# Patient Record
Sex: FEMALE | Race: WHITE | NOT HISPANIC OR LATINO | Employment: OTHER | URBAN - METROPOLITAN AREA
[De-identification: names, ages, dates, MRNs, and addresses within clinical notes are randomized per-mention and may not be internally consistent; named-entity substitution may affect disease eponyms.]

---

## 2017-03-06 DIAGNOSIS — Z12.31 ENCOUNTER FOR SCREENING MAMMOGRAM FOR MALIGNANT NEOPLASM OF BREAST: ICD-10-CM

## 2017-03-07 ENCOUNTER — ALLSCRIPTS OFFICE VISIT (OUTPATIENT)
Dept: OTHER | Facility: OTHER | Age: 72
End: 2017-03-07

## 2017-03-07 ENCOUNTER — GENERIC CONVERSION - ENCOUNTER (OUTPATIENT)
Dept: OTHER | Facility: OTHER | Age: 72
End: 2017-03-07

## 2017-03-13 ENCOUNTER — HOSPITAL ENCOUNTER (OUTPATIENT)
Dept: RADIOLOGY | Age: 72
Discharge: HOME | End: 2017-03-13
Payer: COMMERCIAL

## 2017-03-13 DIAGNOSIS — Z12.31 ENCOUNTER FOR SCREENING MAMMOGRAM FOR MALIGNANT NEOPLASM OF BREAST: ICD-10-CM

## 2017-03-13 PROCEDURE — G0202 SCR MAMMO BI INCL CAD: HCPCS

## 2017-06-09 ENCOUNTER — HOSPITAL ENCOUNTER (OUTPATIENT)
Dept: NON INVASIVE DIAGNOSTICS | Facility: CLINIC | Age: 72
Discharge: HOME | End: 2017-06-09
Payer: COMMERCIAL

## 2017-06-09 DIAGNOSIS — I10 ESSENTIAL (PRIMARY) HYPERTENSION: ICD-10-CM

## 2017-06-09 DIAGNOSIS — I35.9 NONRHEUMATIC AORTIC VALVE DISORDER: ICD-10-CM

## 2017-06-09 DIAGNOSIS — R94.31 ABNORMAL ELECTROCARDIOGRAM: ICD-10-CM

## 2017-06-09 PROCEDURE — 93306 TTE W/DOPPLER COMPLETE: CPT

## 2017-09-18 ENCOUNTER — ALLSCRIPTS OFFICE VISIT (OUTPATIENT)
Dept: OTHER | Facility: OTHER | Age: 72
End: 2017-09-18

## 2017-10-26 NOTE — PROGRESS NOTES
Assessment  Assessed    1  Aortic valve disease (424 1) (I35 9)   2  Hypertension (401 9) (I10)   3  Abnormal ECG (794 31) (R94 31)    Plan  Abnormal ECG, Aortic valve disease, Hypertension    · Follow-up visit in 1 year Evaluation and Treatment  Follow-up  Status: Hold For -  Scheduling  Requested for: 26Iyf4434   Ordered; For: Abnormal ECG, Aortic valve disease, Hypertension; Ordered By: Teofilo Masesy Performed:  Due: 31IBR9504   · ECHO COMPLETE WITH CONTRAST IF INDICATED; Status:Need Information - Financial  Authorization; Requested [id number];    Perform:[facility]; NQI:[id number]; Ordered; For:Abnormal ECG, Aortic valve disease, Hypertension; Ordered By:Brayden King; Aortic valve disease    · Verapamil HCl  MG Oral Capsule Extended Release 24 Hour; TAKE 1  CAPSULE DAILY   Rx By: Teofilo Massey; Dispense: 30 Days ; #:30 Capsule Extended Release 24 Hour; Refill: 0;For: Aortic valve disease; THIEN = N; Record; Last Updated By: Shonda Mansfield; 2016 1:12:26 PM   · EKG/ECG- POC; Status:Complete;   Done: 73LCG0982   Perform: In Office; [phone]; Last Updated By:Scheier, Wilford Revel; 2016 1:12:26 PM;Ordered; For:Aortic valve disease; Ordered By:Brayden King; Discussion/Summary  Cardiology Discussion Summary Free Text Note Form [facility]:   I will continue her present medical regimen  She appears well compensated  I've asked her to call me if there is a problem in the interim otherwise I will see her in one years time  She is due for an echo prior to her next visit to assess LV wall thickness and systolic function  Chief Complaint  Chief Complaint Free Text Note Form: Pt  here for yearly cardiology follow up  Pt  has no cardiac complaints  History of Present Illness  Cardiology HPI Free Text Note Form [facility]: Patient is here for follow-up visit  She was last seen by me in September of last year  Since that time she has been well from a cardiac point of view   She has had no chest pain or significant dyspnea  Unfortunately her  broke up with her and she lost about 35 pounds  She has gained some of the weight back  EKG today looks stable compared to prior studies  Hypertension (Follow-Up): The patient presents for follow-up of essential hypertension  The patient states she has been doing well with her blood pressure control since the last visit  She has no significant interval events  Symptoms: The patient is currently asymptomatic  Review of Systems  Cardiology Female ROS:     Cardiac: No complaints of chest pain, no palpitations, no fainting  Skin: No complaints of nonhealing sores or skin rash  Genitourinary: No complaints of recurrent urinary tract infections, frequent urination at night, difficult urination, blood in urine, kidney stones, loss of bladder control, kidney problems, denies any birth control or hormone replacement, is not post menopausal, not currently pregnant  Psychological: No complaints of feeling depressed, anxiety, panic attacks, or difficulty concentrating  General: No complaints of trouble sleeping, lack of energy, fatigue, appetite changes, weight changes, fever, frequent infections, or night sweats  Respiratory: No complaints of shortness of breath, cough with sputum, or wheezing  HEENT: No complaints of serious problems, hearing problems, nose problems, throat problems, or snoring  Gastrointestinal: No complaints of liver problems, nausea, vomiting, heartburn, constipation, bloody stools, diarrhea, problems swallowing, adbominal pain, or rectal bleeding  Hematologic: No complaints of bleeding disorders, anemia, blood clots, or excessive brusing  Neurological: No complaints of numbness, tingling, dizziness, weakness, seizures, headaches, syncope or fainting, AM fatigue, daytime sleepiness, no witnessed apnea episodes  Musculoskeletal: No complaints of arthritis, back pain, or painfull swelling  Active Problems  Problems    1  Aortic valve disease (424 1) (I35 9)   2  Asthma (493 90) (J45 909)   3  Bipolar disorder (296 80) (F31 9)   4  DJD (degenerative joint disease) (715 90) (M19 90)   5  Encounter for gynecological examination without abnormal finding (V72 31) (Z01 419)   6  Encounter for screening mammogram for malignant neoplasm of breast (V76 12)   (Z12 31)   7  Heart murmur (785 2) (R01 1)   8  Hypertension (401 9) (I10)   9  Osteopenia (733 90) (M85 80)   10  Postmenopausal Atrophic Vaginitis (627 3)   11  Urinary incontinence (788 30) (R32)    Past Medical History  Problems    1  History of Asymptomatic menopausal state (V49 81) (Z78 0)   2  History of Bone spur of foot (726 91) (M77 9)   3  History of Cyst of left ovary (620 2) (N83 20)   4  History of bipolar disorder (V11 1) (Z86 59)   5  History of chest pain (V13 89) (Z87 898)   6  History of diverticulitis of colon (V12 79) (Z87 19)   7  History of hypothyroidism (V12 29) (Z86 39)   8  History of rheumatic fever (V12 09) (Z86 79)   9  History of uterine leiomyoma (V13 29) (Z86 018)   10  History of Trigger finger (727 03) (M65 30)   11  History of Umbilical hernia (915 8) (K42 9)  Active Problems And Past Medical History Reviewed: The active problems and past medical history were reviewed and updated today  Surgical History  Problems    1  History of Back Surgery   2  History of Complete Colonoscopy   3  History of Endometrial Biopsy By Suction   4  History of Knee Replacement   5  History of Tonsillectomy   6  History of Umbilical Hernia Herniorrhaphy   7  History of Wrist Excision Of Ganglion    Family History  Mother    1  Family history of Breast Cancer (V16 3)   2  Family history of sepsis (V18 8) (Z83 1)   3  Family history of Thyroid carcinoma  Father    4  Family history of    5  Family history of sepsis (V18 8) (Z83 1)  Sister    6  Family history of Diabetes Mellitus (V18 0)   7  Family history of Heart Disease (V17 49)  Brother    8   Family history of Heart Disease (V17 49)   9  Family history of S/P CABG (coronary artery bypass graft)  Maternal Aunt    10  Family history of Breast Cancer (V16 3)   11  Family history of Breast Cancer (V16 3)  Family History    12  Family history of Breast Cancer (V16 3)    Social History  Problems    · Never A Smoker   · Occasional alcohol use   · Denied: History of Smokes cigarettes    Current Meds   1  Aspirin 81 MG TABS; Take 1 tablet daily; Therapy: 63UYO4883 to (Evaluate:17Nov2015) Recorded   2  Calcium + D TABS; TAKE 1 TABLET DAILY; Therapy: (Recorded:03Sep2015) to Recorded   3  Diovan -12 5 MG Oral Tablet; TAKE 1 TABLET ONCE DAILY; Therapy: (Recorded:03Sep2015) to Recorded   4  Doxazosin Mesylate 4 MG Oral Tablet; TAKE 1 TABLET DAILY; Therapy: 73OAZ8571 to (Evaluate:15Feb2016)  Requested for: 69EYY4905 Recorded   5  Flovent  MCG/ACT Inhalation Aerosol; Therapy: 84GVF0805 to (Last Rx:29Mar2011)  Requested for: 93GIK9322 Ordered   6  Levothyroxine Sodium 137 MCG Oral Tablet; Therapy: 87NOB1147 to (Last Rx:01Pwd9054)  Requested for: 12Svi7817 Ordered   7  Multi-Vitamin Oral Tablet; TAKE 1 TABLET DAILY; Therapy: 83PCM3137 to Recorded   8  Omega-3 CAPS; TAKE CAPSULE Twice daily; Therapy: (Recorded:03Sep2015) to Recorded   9  Synthroid 150 MCG Oral Tablet; Therapy: 32YHD2903 to (Last Rx:81Azh7610)  Requested for: 32QAT6870 Ordered   10  TEGretol- MG Oral Tablet Extended Release 12 Hour; TAKE 1 TABLET Q  I D; Therapy: 93REM9100 to Recorded   11  Vitamin D 1000 UNIT CAPS; TAKE AS DIRECTED; Therapy: 42GXA4826 to Recorded   12  Wellbutrin 100 MG Oral Tablet; TAKE 1 TABLET EVERY 12 HOURS DAILY; Therapy: 10RFW4950 to Recorded  Medication List Reviewed: The medication list was reviewed and updated today  Allergies  Medication    1  Sulfa Drugs   2   Beta Adrenergic Blockers    Vitals  Vital Signs    Recorded: 12TGN3438 37:37ZF   Systolic 528, RUE, Sitting   Diastolic 70, RUE, Sitting   Heart Rate 66, R Radial   Pulse Quality Regular, R Radial   Height 5 ft 6 in   Weight 185 lb 4 oz   BMI Calculated 29 9   BSA Calculated 1 94     Physical Exam    Constitutional   General appearance: No acute distress, well appearing and well nourished  Eyes   Conjunctiva and Sclera examination: Conjunctiva pink, sclera anicteric  Neck   Neck and thyroid: Normal, supple, trachea midline, no thyromegaly  Pulmonary   Respiratory effort: No increased work of breathing or signs of respiratory distress  Auscultation of lungs: Clear to auscultation, no rales, no rhonchi, no wheezing, good air movement  Cardiovascular   Palpation of heart: Normal PMI, no thrills  Auscultation of heart: Normal rate and rhythm, normal S1 and S2, no murmurs  Carotid pulses: Normal, 2+ bilaterally  Examination of extremities for edema and/or varicosities: Normal     Chest - Chest: Normal    Musculoskeletal Gait and station: Normal gait  -- Digits and nails: Normal without clubbing or cyanosis     Neurologic - Speech: Normal     Psychiatric - Orientation to person, place, and time: Normal -- Mood and affect: Normal       Results/Data  ECG Report: Sinus rhythm with minor nonspecific ST segment changes      Future Appointments    Date/Time Provider Specialty Site   03/07/2017 01:00 PM Christine Riley MD Obstetrics/Gynecology [facility] OB     Signatures   Electronically signed by : NEENA Lopez ; Sep  9 2016  1:25PM EST                       (Author)

## 2018-01-10 NOTE — RESULT NOTES
Verified Results  * MAMMO SCREENING BILATERAL W CAD [id number] 01:51PM [address] Order Number: [id number]    - Patient Instructions: To schedule this appointment, please contact Central Scheduling at [phone]  Do not wear any perfume, powder, lotion or deodorant on breast or underarm area  Please bring your doctors order, referral (if needed) and insurance information with you on the day of the test  Failure to bring this information may result in this test being rescheduled  Arrive 15 minutes prior to your appointment time to register  On the day of your test, please bring any prior mammogram or breast studies with you that were not performed at a [facility]  Failure to bring prior exams may result in your test needing to be rescheduled  Test Name Result Flag Reference   MAMMO SCREENING BILATERAL W CAD (Report)     Patient History:   Patient is postmenopausal    Family history of breast cancer at age 47 in mother, breast    cancer at age 48 or over in maternal aunt, breast cancer at age    48 or over in maternal aunt, breast cancer at age 48 or over in    maternal aunt, breast cancer at age 48 or over in maternal aunt  Took hormonal contraceptives for 10 years  Patient has never smoked  Patient's BMI is 29 0  Reason for exam: screening, asymptomatic  Mammo Screening Bilateral W CAD: March 13, 2017 - Check In #:    [de-identified]   Bilateral CC and MLO view(s) were taken  Technologist: JUAN FRANCISCO Law (JUAN FRANCISCO)(M)   Prior study comparison: January 19, 2016, mammo screening    bilateral W CAD performed at [email or web address]  August 1, 2013, digital bilateral screening mammogram performed at [facility]  July 31, 2012, digital bilateral    screening mammogram performed at [facility]  July 18, 2011, digital bilateral screening mammogram performed at   [facility]   July 12, 2010, digital bilateral screening mammogram performed at [facility]  There are scattered fibroglandular densities  No dominant soft tissue mass, architectural distortion or    suspicious calcifications are noted in either breast  The skin    and nipple contours are within normal limits  No evidence of malignancy  No significant changes when compared with prior studies  ACR BI-RADSï¾® Assessments: BiRad:1 - Negative     Recommendation:   Routine screening mammogram of both breasts in 1 year  A    reminder letter will be scheduled  Analyzed by CAD     8-10% of cancers will be missed on mammography  Management of a    palpable abnormality must be based on clinical grounds  Patients   will be notified of their results via letter from our facility  Accredited by [employer] of Radiology and FDA  Transcription Location: [facility] 98: [id number]     Risk Value(s):   Tyrer-Cuzick 10 Year: 9 500%, Tyrer-Cuzick Lifetime: 13 600%,    Myriad Table: 1 5%, JUNE 5 Year: 3 3%, NCI Lifetime: 9 1%, MRS    : Based on personal and/or family history,    consideration of hereditary risk assessment may be warranted     Signed by:   Zak Weems MD   3/13/17

## 2018-01-14 VITALS
WEIGHT: 215.5 LBS | HEART RATE: 81 BPM | SYSTOLIC BLOOD PRESSURE: 146 MMHG | BODY MASS INDEX: 35.9 KG/M2 | HEIGHT: 65 IN | DIASTOLIC BLOOD PRESSURE: 76 MMHG

## 2018-01-15 VITALS
HEIGHT: 65 IN | BODY MASS INDEX: 33.99 KG/M2 | DIASTOLIC BLOOD PRESSURE: 78 MMHG | WEIGHT: 204 LBS | SYSTOLIC BLOOD PRESSURE: 134 MMHG

## 2018-01-18 NOTE — RESULT NOTES
Verified Results  * MAMMO SCREENING BILATERAL W CAD [id number] 03:42PM [facility]     Test Name Result Flag Reference   MAMMO SCREENING BILATERAL W CAD (Report)     Patient History:   Patient is postmenopausal    Family history of breast cancer in mother at age 47 and breast    cancer in 4 maternal aunts at age 48 or over  Took hormonal contraceptives for 10 years  Patient has never smoked  Patient's BMI is 29 0  Reason for exam: screening (asymptomatic)  Mammo Screening Bilateral W CAD: January 19, 2016 - Check In #:    [de-identified]   Bilateral CC and MLO view(s) were taken  Technologist: Emmanuel Palmer RT(R)(M)   Prior study comparison: August 1, 2013, digital bilateral    screening mammogram performed at [address]  July 31, 2012, digital bilateral screening mammogram performed at   [address]  July 18, 2011, digital bilateral    screening mammogram performed at [address]  July 12, 2010, digital bilateral screening mammogram performed at   [address]  July 10, 2009, bilateral [facility]    digtl charis scrn mammo performed at [address]  The breast tissue is almost entirely fat  No dominant soft tissue mass or suspicious calcifications are    noted  The skin and nipple contours are within normal limits  No mammographic evidence of malignancy  No significant changes when compared with prior studies  ASSESSMENT: BiRad:1 - Negative     Recommendation:   Routine screening mammogram of both breasts in 1 year  A    reminder letter will be scheduled  Analyzed by CAD     8-10% of cancers will be missed on mammography  Management of a    palpable abnormality must be based on clinical grounds  Patients   will be notified of their results via letter from our facility  Accredited by [employer] of Rad[employer] and FDA       Transcription Location: [facility] 98: [id number]     Risk Value(s):   Payam-Popeye 10 Year: 9 255%, Payam-Popeye Lifetime: 14 406%,    Myriad Table: 1 5%, JUNE 5 Year: 3 3%, NCI Lifetime: 9 5%, MRS    : Based on personal and/or family history,    consideration of hereditary risk assessment may be warranted     Signed by:   Bridgette Chandler MD   1/19/16

## 2018-03-16 ENCOUNTER — TRANSCRIBE ORDERS (OUTPATIENT)
Dept: RADIOLOGY | Facility: CLINIC | Age: 73
End: 2018-03-16

## 2018-03-19 ENCOUNTER — TELEPHONE (OUTPATIENT)
Dept: OBGYN CLINIC | Facility: CLINIC | Age: 73
End: 2018-03-19

## 2018-03-19 ENCOUNTER — HOSPITAL ENCOUNTER (OUTPATIENT)
Dept: RADIOLOGY | Age: 73
Discharge: HOME | End: 2018-03-19
Payer: COMMERCIAL

## 2018-03-19 DIAGNOSIS — Z12.31 SCREENING MAMMOGRAM, ENCOUNTER FOR: Primary | ICD-10-CM

## 2018-03-19 DIAGNOSIS — Z12.31 ENCOUNTER FOR SCREENING MAMMOGRAM FOR MALIGNANT NEOPLASM OF BREAST: ICD-10-CM

## 2018-03-19 DIAGNOSIS — Z12.31 SCREENING MAMMOGRAM, ENCOUNTER FOR: ICD-10-CM

## 2018-03-19 PROCEDURE — 77067 SCR MAMMO BI INCL CAD: CPT

## 2018-06-01 ENCOUNTER — ANNUAL EXAM (OUTPATIENT)
Dept: OBGYN CLINIC | Facility: CLINIC | Age: 73
End: 2018-06-01
Payer: COMMERCIAL

## 2018-06-01 VITALS
HEIGHT: 65 IN | SYSTOLIC BLOOD PRESSURE: 128 MMHG | DIASTOLIC BLOOD PRESSURE: 66 MMHG | WEIGHT: 165 LBS | BODY MASS INDEX: 27.49 KG/M2

## 2018-06-01 DIAGNOSIS — Z01.419 ENCOUNTER FOR GYNECOLOGICAL EXAMINATION (GENERAL) (ROUTINE) WITHOUT ABNORMAL FINDINGS: Primary | ICD-10-CM

## 2018-06-01 DIAGNOSIS — Z12.31 ENCOUNTER FOR SCREENING MAMMOGRAM FOR MALIGNANT NEOPLASM OF BREAST: ICD-10-CM

## 2018-06-01 PROCEDURE — S0612 ANNUAL GYNECOLOGICAL EXAMINA: HCPCS | Performed by: OBSTETRICS & GYNECOLOGY

## 2018-06-01 PROCEDURE — G0145 SCR C/V CYTO,THINLAYER,RESCR: HCPCS | Performed by: OBSTETRICS & GYNECOLOGY

## 2018-06-01 NOTE — PROGRESS NOTES
Assessment/Plan: This 77-year-old patient is seen today for annual gyn evaluation  She has a complaint of significant weight loss over the last several months  No problem-specific Assessment & Plan notes found for this encounter  Subjective:      Patient ID: Tennille Gottlieb is a 67 y o  female  This 77-year-old patient has had no vaginal bleeding or episodes of vaginitis over the past year  She has no chronic headaches fainting spells or hot flashes  She sleeps about 9 hr at night  Her bowel function is normal  She does wear pads daily for urine stress incontinence  She has had no urinary tract infections over the past year  There is still was no divorce from her  with her marriage  He apparently still lives in [facility]  Review of Systems   Constitutional: Negative  HENT: Negative  Eyes: Negative  Respiratory: Negative  Cardiovascular: Negative  Gastrointestinal: Negative  Endocrine: Negative  Genitourinary: Negative  She does wear pads for urine stress incontinence  Musculoskeletal: Negative  Skin: Negative  Allergic/Immunologic: Negative  Neurological: Negative  Hematological: Negative  Psychiatric/Behavioral: Negative  Objective:      /66 (BP Location: Left arm, Patient Position: Sitting, Cuff Size: Standard)   Ht 5' 4 5" (1 638 m)   Wt 74 8 kg (165 lb)   BMI 27 88 kg/m²          Physical Exam   Constitutional: She is oriented to person, place, and time  She appears well-developed and well-nourished  HENT:   Head: Normocephalic  Neck: Normal range of motion  Neck supple  Cardiovascular: Normal rate, regular rhythm, normal heart sounds and intact distal pulses  Pulmonary/Chest: Effort normal and breath sounds normal    Abdominal: Soft  Bowel sounds are normal    Genitourinary: Uterus normal    Musculoskeletal: Normal range of motion  Neurological: She is alert and oriented to person, place, and time  Skin: Skin is warm and dry  Psychiatric: She has a normal mood and affect  Nursing note and vitals reviewed  breast exam is normal  Pelvic exam reveals uterus to be anterior mobile normal size  No ovarian masses are detected  Vagina shows no blood or discharge  The vulvar structures are normal  Rectal exam shows no bladder masses in the rectum and no nodularity in the cul-de-sac

## 2018-06-01 NOTE — PATIENT INSTRUCTIONS
This 72-year-old patient was told that her breast and pelvic exam are normal  She was advised to continue to see her ophthalmologist with regard to her conjunctivitis in her left eye  She will call if she sees any vaginal bleeding over the next year

## 2018-06-06 LAB
LAB AP GYN PRIMARY INTERPRETATION: NORMAL
Lab: NORMAL

## 2018-06-11 ENCOUNTER — APPOINTMENT (OUTPATIENT)
Dept: LAB | Facility: HOSPITAL | Age: 73
End: 2018-06-11
Attending: INTERNAL MEDICINE
Payer: COMMERCIAL

## 2018-06-11 ENCOUNTER — TRANSCRIBE ORDERS (OUTPATIENT)
Dept: LAB | Facility: HOSPITAL | Age: 73
End: 2018-06-11

## 2018-06-11 ENCOUNTER — TRANSCRIBE ORDERS (OUTPATIENT)
Dept: ADMINISTRATIVE | Facility: HOSPITAL | Age: 73
End: 2018-06-11

## 2018-06-11 DIAGNOSIS — K83.8 DILATED INTRAHEPATIC BILE DUCT: Primary | ICD-10-CM

## 2018-06-11 DIAGNOSIS — R74.9 NONSPECIFIC ABNORMAL SERUM ENZYME LEVELS: ICD-10-CM

## 2018-06-11 DIAGNOSIS — K83.8 BILE DUCT PROLIFERATION: ICD-10-CM

## 2018-06-11 DIAGNOSIS — K83.8 BILE DUCT PROLIFERATION: Primary | ICD-10-CM

## 2018-06-11 LAB — FERRITIN SERPL-MCNC: 83 NG/ML (ref 8–388)

## 2018-06-11 PROCEDURE — 86376 MICROSOMAL ANTIBODY EACH: CPT

## 2018-06-11 PROCEDURE — 86038 ANTINUCLEAR ANTIBODIES: CPT

## 2018-06-11 PROCEDURE — 36415 COLL VENOUS BLD VENIPUNCTURE: CPT

## 2018-06-11 PROCEDURE — 86235 NUCLEAR ANTIGEN ANTIBODY: CPT

## 2018-06-11 PROCEDURE — 86301 IMMUNOASSAY TUMOR CA 19-9: CPT

## 2018-06-11 PROCEDURE — 80074 ACUTE HEPATITIS PANEL: CPT

## 2018-06-11 PROCEDURE — 83516 IMMUNOASSAY NONANTIBODY: CPT

## 2018-06-11 PROCEDURE — 82728 ASSAY OF FERRITIN: CPT

## 2018-06-12 LAB
ACTIN IGG SERPL-ACNC: 7 UNITS (ref 0–19)
CANCER AG19-9 SERPL-ACNC: 21 U/ML (ref 0–35)
HAV IGM SER QL: NORMAL
HBV CORE IGM SER QL: NORMAL
HBV SURFACE AG SER QL: NORMAL
HCV AB SER QL: NORMAL
THYROPEROXIDASE AB SERPL-ACNC: 270 IU/ML (ref 0–34)
TTG IGA SER-ACNC: <2 U/ML (ref 0–3)
TTG IGG SER-ACNC: <2 U/ML (ref 0–5)

## 2018-06-13 LAB — RYE IGE QN: NEGATIVE

## 2018-06-26 ENCOUNTER — HOSPITAL ENCOUNTER (OUTPATIENT)
Dept: RADIOLOGY | Facility: HOSPITAL | Age: 73
Discharge: HOME | End: 2018-06-26
Attending: INTERNAL MEDICINE
Payer: COMMERCIAL

## 2018-06-26 DIAGNOSIS — K83.8 DILATED INTRAHEPATIC BILE DUCT: ICD-10-CM

## 2018-06-26 PROCEDURE — 74183 MRI ABD W/O CNTR FLWD CNTR: CPT

## 2018-06-26 PROCEDURE — A9585 GADOBUTROL INJECTION: HCPCS | Performed by: INTERNAL MEDICINE

## 2018-06-26 RX ADMIN — GADOBUTROL 7 ML: 604.72 INJECTION INTRAVENOUS at 15:13

## 2018-07-02 ENCOUNTER — ANESTHESIA EVENT (OUTPATIENT)
Dept: GASTROENTEROLOGY | Facility: HOSPITAL | Age: 73
End: 2018-07-02
Payer: COMMERCIAL

## 2018-07-02 ENCOUNTER — ANESTHESIA (OUTPATIENT)
Dept: GASTROENTEROLOGY | Facility: HOSPITAL | Age: 73
End: 2018-07-02
Payer: COMMERCIAL

## 2018-07-02 ENCOUNTER — HOSPITAL ENCOUNTER (OUTPATIENT)
Facility: HOSPITAL | Age: 73
Setting detail: OUTPATIENT SURGERY
Discharge: HOME | End: 2018-07-02
Attending: INTERNAL MEDICINE | Admitting: INTERNAL MEDICINE
Payer: COMMERCIAL

## 2018-07-02 ENCOUNTER — APPOINTMENT (OUTPATIENT)
Dept: RADIOLOGY | Facility: HOSPITAL | Age: 73
End: 2018-07-02
Attending: INTERNAL MEDICINE
Payer: COMMERCIAL

## 2018-07-02 VITALS
BODY MASS INDEX: 26.36 KG/M2 | OXYGEN SATURATION: 94 % | HEIGHT: 66 IN | HEART RATE: 71 BPM | DIASTOLIC BLOOD PRESSURE: 62 MMHG | TEMPERATURE: 97 F | SYSTOLIC BLOOD PRESSURE: 112 MMHG | WEIGHT: 164 LBS | RESPIRATION RATE: 16 BRPM

## 2018-07-02 PROCEDURE — 76000 FLUOROSCOPY <1 HR PHYS/QHP: CPT

## 2018-07-02 PROCEDURE — C1769 GUIDE WIRE: HCPCS | Performed by: INTERNAL MEDICINE

## 2018-07-02 RX ORDER — FLUTICASONE PROPIONATE 220 UG/1
2 AEROSOL, METERED RESPIRATORY (INHALATION) 2 TIMES DAILY PRN
COMMUNITY

## 2018-07-02 RX ORDER — EPHEDRINE SULFATE 50 MG/ML
INJECTION, SOLUTION INTRAVENOUS AS NEEDED
Status: DISCONTINUED | OUTPATIENT
Start: 2018-07-02 | End: 2018-07-02 | Stop reason: SURG

## 2018-07-02 RX ORDER — SODIUM CHLORIDE 9 MG/ML
50 INJECTION, SOLUTION INTRAVENOUS CONTINUOUS
Status: DISCONTINUED | OUTPATIENT
Start: 2018-07-02 | End: 2018-07-02 | Stop reason: HOSPADM

## 2018-07-02 RX ORDER — PROPOFOL 10 MG/ML
INJECTION, EMULSION INTRAVENOUS AS NEEDED
Status: DISCONTINUED | OUTPATIENT
Start: 2018-07-02 | End: 2018-07-02 | Stop reason: SURG

## 2018-07-02 RX ORDER — ONDANSETRON 2 MG/ML
INJECTION INTRAMUSCULAR; INTRAVENOUS AS NEEDED
Status: DISCONTINUED | OUTPATIENT
Start: 2018-07-02 | End: 2018-07-02 | Stop reason: SURG

## 2018-07-02 RX ORDER — ROCURONIUM BROMIDE 10 MG/ML
INJECTION, SOLUTION INTRAVENOUS AS NEEDED
Status: DISCONTINUED | OUTPATIENT
Start: 2018-07-02 | End: 2018-07-02 | Stop reason: SURG

## 2018-07-02 RX ORDER — SUCCINYLCHOLINE CHLORIDE 20 MG/ML
INJECTION INTRAMUSCULAR; INTRAVENOUS AS NEEDED
Status: DISCONTINUED | OUTPATIENT
Start: 2018-07-02 | End: 2018-07-02 | Stop reason: SURG

## 2018-07-02 RX ORDER — GLYCOPYRROLATE 0.2 MG/ML
INJECTION INTRAMUSCULAR; INTRAVENOUS AS NEEDED
Status: DISCONTINUED | OUTPATIENT
Start: 2018-07-02 | End: 2018-07-02 | Stop reason: SURG

## 2018-07-02 RX ADMIN — ONDANSETRON 4 MG: 2 INJECTION INTRAMUSCULAR; INTRAVENOUS at 12:59

## 2018-07-02 RX ADMIN — NEOSTIGMINE METHYLSULFATE 4 MG: 1 INJECTION, SOLUTION INTRAMUSCULAR; INTRAVENOUS; SUBCUTANEOUS at 12:53

## 2018-07-02 RX ADMIN — EPHEDRINE SULFATE 5 MG: 50 INJECTION, SOLUTION INTRAMUSCULAR; INTRAVENOUS; SUBCUTANEOUS at 12:02

## 2018-07-02 RX ADMIN — EPHEDRINE SULFATE 5 MG: 50 INJECTION, SOLUTION INTRAMUSCULAR; INTRAVENOUS; SUBCUTANEOUS at 12:06

## 2018-07-02 RX ADMIN — PROPOFOL 160 MG: 10 INJECTION, EMULSION INTRAVENOUS at 11:29

## 2018-07-02 RX ADMIN — SUCCINYLCHOLINE CHLORIDE 100 MG: 20 INJECTION, SOLUTION INTRAMUSCULAR; INTRAVENOUS at 11:29

## 2018-07-02 RX ADMIN — EPHEDRINE SULFATE 5 MG: 50 INJECTION, SOLUTION INTRAMUSCULAR; INTRAVENOUS; SUBCUTANEOUS at 11:50

## 2018-07-02 RX ADMIN — EPHEDRINE SULFATE 5 MG: 50 INJECTION, SOLUTION INTRAMUSCULAR; INTRAVENOUS; SUBCUTANEOUS at 12:31

## 2018-07-02 RX ADMIN — SODIUM CHLORIDE: 0.9 INJECTION, SOLUTION INTRAVENOUS at 11:27

## 2018-07-02 RX ADMIN — GLYCOPYRROLATE 0.8 MG: 0.2 INJECTION, SOLUTION INTRAMUSCULAR; INTRAVENOUS at 12:53

## 2018-07-02 RX ADMIN — EPHEDRINE SULFATE 10 MG: 50 INJECTION, SOLUTION INTRAMUSCULAR; INTRAVENOUS; SUBCUTANEOUS at 12:34

## 2018-07-02 RX ADMIN — GLUCAGON HYDROCHLORIDE 0.5 MG: KIT at 12:24

## 2018-07-02 RX ADMIN — ROCURONIUM BROMIDE 20 MG: 10 INJECTION INTRAVENOUS at 11:56

## 2018-07-02 NOTE — ANESTHESIA POSTPROCEDURE EVALUATION
Post-Op Assessment Note      CV Status:  Stable    Mental Status:  Alert and awake    Hydration Status:  Euvolemic    PONV Controlled:  Controlled    Airway Patency:  Patent    Post Op Vitals Reviewed: Yes          Staff: AnesthesiologistGEN           BP   96/44   Temp  98   Pulse  75   Resp   16   SpO2   96

## 2018-07-02 NOTE — OP NOTE
**** GI/ENDOSCOPY REPORT ****     INTRODUCTION: Endoscopic Retrograde Cholangiopancreatography - A 67  year-old female patient presents for an outpatient Endoscopic Retrograde   Cholangiopancreatography at [address] [place] [address]  INDICATIONS: Suspected bile duct stone, diagnosed by MRCP  CONSENT:  The benefits, risks, and alternatives to the procedure were   discussed and informed consent was obtained from the patient  PREPARATION:  EKG, pulse, pulse oximetry and blood pressure were monitored   throughout the procedure  MEDICATIONS: Anesthesia-check records     PROCEDURE:  The endoscope was passed with ease through the mouth under   direct visualization and advanced to the duodenum  The scope was withdrawn   and the mucosa was carefully examined  FINDINGS:   Biliary tract: An attempted examination of the biliary tract   was unsuccessful  The major papilla was located on the edge of the   diverticulum  The major papilla was " very floppy"  Unable to cannulate   despite multiple attempts with different catheters including needle knife  Major papilla: The major papilla was found inside a medium-sized   diverticulum  The major papilla appeared to be normal      COMPLICATIONS: There were no complications  IMPRESSIONS:  Failed biliary tract examination  The major papilla was   located on the edge of the diverticulum  Major papilla found within a   diverticulum  Major papilla normal      RECOMMENDATIONS: Call Dr Venegas Files [phone] with questions or   problems       ESTIMATED BLOOD LOSS:     PROCEDURE CODES:     ICD-9 Codes: 574 5 Calculus of bile duct w/o mention of cholecystitis     ICD-10 Codes: R93 3 Abnormal findings on diagnostic imaging of other parts   of digestive tract     PERFORMED BY: NEENA Nuno  on 07/02/2018  Version 2, modified and electronically signed by NEENA Nuno  on 07/02/2018 at 16:36, superseding version 1 signed on 07/02/2018 at   12:57

## 2018-07-02 NOTE — OP NOTE
**** GI/ENDOSCOPY REPORT ****     PATIENT NAME: Kacie Flowers - VISIT ID:  Patient ID: [id number]   YOB: 1945     INTRODUCTION: Endoscopic Retrograde Cholangiopancreatography - A 67  year-old female patient presents for an outpatient Endoscopic Retrograde   Cholangiopancreatography at [address] [place] [address]  INDICATIONS: Suspected bile duct stone, diagnosed by MRCP  CONSENT:  The benefits, risks, and alternatives to the procedure were   discussed and informed consent was obtained from the patient  PREPARATION:  EKG, pulse, pulse oximetry and blood pressure were monitored   throughout the procedure  MEDICATIONS: Anesthesia-check records     PROCEDURE:  The endoscope was passed with ease through the mouth under   direct visualization and advanced to the duodenum  The scope was withdrawn   and the mucosa was carefully examined  FINDINGS:   Biliary tract: An attempted examination of the biliary tract   was unsuccessful  The major papilla was located on the edge of the   diverticulum  The major papilla was " very floppy"  Unable to cannulate   despite multiple attempts with different catheters including needle knife  Major papilla: The major papilla was found inside a medium-sized   diverticulum  The major papilla appeared to be normal      COMPLICATIONS: There were no complications  IMPRESSIONS:  Failed biliary tract examination  The major papilla was   located on the edge of the diverticulum  Major papilla found within a   diverticulum  Major papilla normal      RECOMMENDATIONS: Call Dr Itzel Gutierrez [phone] with questions or   problems       ESTIMATED BLOOD LOSS:     PROCEDURE CODES:     ICD-9 Codes: 574 5 Calculus of bile duct w/o mention of cholecystitis     ICD-10 Codes: R93 3 Abnormal findings on diagnostic imaging of other parts   of digestive tract     PERFORMED BY: NEENA Trejo  on 07/02/2018  Version 1, electronically signed by NEENA Mejia  on   07/02/2018 at 12:57

## 2018-07-02 NOTE — H&P
History and Physical - [facility]  Taty Walker 67 y o  female MRN: [id number]                  HPI: Taty Walker is a 67y o  year old female who presents for choledocholithiasis, elevated liver function tests and dilated common bile duct on MRCP  REVIEW OF SYSTEMS: Per the HPI, and otherwise unremarkable  Historical Information   Past Medical History:   Diagnosis Date    Arthritis     Asthma     Bipolar disorder ([address] )     Cyst of left ovary     Depression     Diverticulitis of colon     Hypertension     Hypothyroidism     Rheumatic fever     history of    Umbilical hernia     Uterine leiomyoma      Past Surgical History:   Procedure Laterality Date    ACHILLES TENDON SURGERY      BACK SURGERY      BILATERAL KNEE ARTHROSCOPY      COLONOSCOPY N/A 9/7/2016    Procedure: COLONOSCOPY;  Surgeon: Milo Dougherty MD;  Location: [facility];   Service:     ENDOMETRIAL BIOPSY      BY SUCTION    GANGLION CYST EXCISION      WRIST    HERNIA REPAIR      UMBILICAL HERNIA HERNIORRHAPHY    REPLACEMENT TOTAL KNEE      SPINE SURGERY      TONSILLECTOMY AND ADENOIDECTOMY       Social History   History   Alcohol Use    Yes     Comment: rarely     History   Drug Use No     History   Smoking Status    Never Smoker   Smokeless Tobacco    Never Used     Family History   Problem Relation Age of Onset   Reesa Reichmann Breast cancer Mother 47    Other Mother         SEPSIS    Thyroid cancer Mother     Other Father         SEPSIS     Diabetes Sister     Heart disease Sister     Heart disease Brother         CABG     Breast cancer Maternal Aunt     Breast cancer Family         4 AUNTS        Meds/Allergies     Prescriptions Prior to Admission   Medication    ALBUTEROL SULFATE HFA IN    aspirin (ECOTRIN LOW STRENGTH) 81 mg EC tablet    fluticasone (FLOVENT HFA) 220 mcg/act inhaler    Levothyroxine Sodium 137 MCG CAPS    valsartan-hydrochlorothiazide (DIOVAN-HCT) 320-12 5 MG per tablet    verapamil (CALAN-SR) 180 mg CR tablet    buPROPion (WELLBUTRIN SR) 100 mg 12 hr tablet    Calcium Carbonate-Vitamin D (CALTRATE 600+D PO)    carBAMazepine (TEGretol) 200 mg tablet    Cholecalciferol (VITAMIN D3) 1000 UNITS CAPS    doxazosin (CARDURA) 4 mg tablet    levothyroxine 150 mcg tablet    Omega-3 Fatty Acids (OMEGA-3 FISH OIL PO)       Allergies   Allergen Reactions    Beta Adrenergic Blockers     Sulfa Antibiotics Hives       Objective     Blood pressure 135/72, pulse 69, temperature (!) 97 °F (36 1 °C), temperature source Oral, resp  rate 16, height 5' 6" (1 676 m), weight 74 4 kg (164 lb), SpO2 97 %        PHYSICAL EXAM    Gen: NAD  CV: RRR  CHEST: Clear  ABD: soft, NT/ND  EXT: no edema      ASSESSMENT/PLAN:  This is a 67y o  year old female here for choledocholithiasis and abnormal liver function tests, dilated common bile duct on MRCP    PLAN:  ERCP with stone extraction  Procedure:

## 2018-07-02 NOTE — ANESTHESIA PREPROCEDURE EVALUATION
Review of Systems/Medical History    Chart reviewed  No history of anesthetic complications     Cardiovascular  Exercise tolerance (METS): >4,  Hypertension on > 1 medication,   Comment: 2017 SUMMARY     LEFT VENTRICLE:  Systolic function was normal  Ejection fraction was estimated to be 60 %  There were no regional wall motion abnormalities  There was no evidence of concentric hypertrophy      RIGHT VENTRICLE:  The size was normal   Systolic function was normal      MITRAL VALVE:  There was mild annular calcification  There was trace regurgitation      TRICUSPID VALVE:  There was trace regurgitation  Pulmonary artery systolic pressure was within the normal range ,  Pulmonary  Asthma , well controlled/ stable Asthma type of rescue: PRN inhaler,        GI/Hepatic      Comment: Gallstones, obstruction          Endo/Other  History of thyroid disease , hypothyroidism,      GYN       Hematology   Musculoskeletal    Arthritis     Neurology   Psychology   Depression , bipolar disorder,              Physical Exam    Airway    Mallampati score: I  TM Distance: >3 FB  Neck ROM: full     Dental   No notable dental hx     Cardiovascular      Pulmonary      Other Findings        Anesthesia Plan  ASA Score- 2     Anesthesia Type- general with ASA Monitors  Additional Monitors:   Airway Plan: ETT  Plan Factors-    Induction- intravenous  Postoperative Plan-     Informed Consent- Anesthetic plan and risks discussed with patient  I personally reviewed this patient with the CRNA  Discussed and agreed on the Anesthesia Plan with the CRNA  Gilda Pagan

## 2018-09-27 ENCOUNTER — APPOINTMENT (EMERGENCY)
Dept: RADIOLOGY | Facility: HOSPITAL | Age: 73
DRG: 184 | End: 2018-09-27
Payer: COMMERCIAL

## 2018-09-27 ENCOUNTER — HOSPITAL ENCOUNTER (INPATIENT)
Facility: HOSPITAL | Age: 73
LOS: 1 days | Discharge: NURSING, REHAB OR LONG-TERM CARE | DRG: 184 | End: 2018-10-02
Attending: EMERGENCY MEDICINE | Admitting: SURGERY
Payer: COMMERCIAL

## 2018-09-27 DIAGNOSIS — S22.41XD CLOSED FRACTURE OF MULTIPLE RIBS OF RIGHT SIDE WITH ROUTINE HEALING: ICD-10-CM

## 2018-09-27 DIAGNOSIS — S22.49XA RIB FRACTURES: ICD-10-CM
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
## 2018-09-27 DIAGNOSIS — S01.511A LIP LACERATION, INITIAL ENCOUNTER: ICD-10-CM

## 2018-09-27 DIAGNOSIS — S52.125A CLOSED NONDISPLACED FRACTURE OF HEAD OF LEFT RADIUS, INITIAL ENCOUNTER: ICD-10-CM

## 2018-09-27 LAB
ANION GAP SERPL CALCULATED.3IONS-SCNC: 7 MMOL/L (ref 4–13)
ATRIAL RATE: 68 BPM
BASOPHILS # BLD AUTO: 0.03 THOUSANDS/ΜL (ref 0–0.1)
BASOPHILS NFR BLD AUTO: 0 % (ref 0–1)
BUN SERPL-MCNC: 9 MG/DL (ref 5–25)
CALCIUM SERPL-MCNC: 8.5 MG/DL (ref 8.3–10.1)
CHLORIDE SERPL-SCNC: 94 MMOL/L (ref 100–108)
CO2 SERPL-SCNC: 27 MMOL/L (ref 21–32)
CREAT SERPL-MCNC: 0.73 MG/DL (ref 0.6–1.3)
EOSINOPHIL # BLD AUTO: 0.14 THOUSAND/ΜL (ref 0–0.61)
EOSINOPHIL NFR BLD AUTO: 2 % (ref 0–6)
ERYTHROCYTE [DISTWIDTH] IN BLOOD BY AUTOMATED COUNT: 12.2 % (ref 11.6–15.1)
GFR SERPL CREATININE-BSD FRML MDRD: 83 ML/MIN/1.73SQ M
GLUCOSE SERPL-MCNC: 109 MG/DL (ref 65–140)
HCT VFR BLD AUTO: 38.3 % (ref 34.8–46.1)
HGB BLD-MCNC: 12.7 G/DL (ref 11.5–15.4)
IMM GRANULOCYTES # BLD AUTO: 0.06 THOUSAND/UL (ref 0–0.2)
IMM GRANULOCYTES NFR BLD AUTO: 1 % (ref 0–2)
LYMPHOCYTES # BLD AUTO: 1.29 THOUSANDS/ΜL (ref 0.6–4.47)
LYMPHOCYTES NFR BLD AUTO: 19 % (ref 14–44)
MCH RBC QN AUTO: 30.9 PG (ref 26.8–34.3)
MCHC RBC AUTO-ENTMCNC: 33.2 G/DL (ref 31.4–37.4)
MCV RBC AUTO: 93 FL (ref 82–98)
MONOCYTES # BLD AUTO: 0.51 THOUSAND/ΜL (ref 0.17–1.22)
MONOCYTES NFR BLD AUTO: 7 % (ref 4–12)
NEUTROPHILS # BLD AUTO: 4.9 THOUSANDS/ΜL (ref 1.85–7.62)
NEUTS SEG NFR BLD AUTO: 71 % (ref 43–75)
NRBC BLD AUTO-RTO: 0 /100 WBCS
P AXIS: 63 DEGREES
PLATELET # BLD AUTO: 270 THOUSANDS/UL (ref 149–390)
PMV BLD AUTO: 8.5 FL (ref 8.9–12.7)
POTASSIUM SERPL-SCNC: 3.7 MMOL/L (ref 3.5–5.3)
PR INTERVAL: 144 MS
QRS AXIS: 50 DEGREES
QRSD INTERVAL: 86 MS
QT INTERVAL: 382 MS
QTC INTERVAL: 406 MS
RBC # BLD AUTO: 4.11 MILLION/UL (ref 3.81–5.12)
SODIUM SERPL-SCNC: 128 MMOL/L (ref 136–145)
T WAVE AXIS: 51 DEGREES
VENTRICULAR RATE: 68 BPM
WBC # BLD AUTO: 6.93 THOUSAND/UL (ref 4.31–10.16)

## 2018-09-27 PROCEDURE — 36415 COLL VENOUS BLD VENIPUNCTURE: CPT | Performed by: EMERGENCY MEDICINE

## 2018-09-27 PROCEDURE — 99285 EMERGENCY DEPT VISIT HI MDM: CPT

## 2018-09-27 PROCEDURE — 71250 CT THORAX DX C-: CPT

## 2018-09-27 PROCEDURE — 0HQ1XZZ REPAIR FACE SKIN, EXTERNAL APPROACH: ICD-10-PCS | Performed by: EMERGENCY MEDICINE

## 2018-09-27 PROCEDURE — 72125 CT NECK SPINE W/O DYE: CPT

## 2018-09-27 PROCEDURE — 73090 X-RAY EXAM OF FOREARM: CPT

## 2018-09-27 PROCEDURE — 80048 BASIC METABOLIC PNL TOTAL CA: CPT | Performed by: EMERGENCY MEDICINE

## 2018-09-27 PROCEDURE — 85025 COMPLETE CBC W/AUTO DIFF WBC: CPT | Performed by: EMERGENCY MEDICINE

## 2018-09-27 PROCEDURE — 12011 RPR F/E/E/N/L/M 2.5 CM/<: CPT | Performed by: SURGERY

## 2018-09-27 PROCEDURE — 99204 OFFICE O/P NEW MOD 45 MIN: CPT | Performed by: ORTHOPAEDIC SURGERY

## 2018-09-27 PROCEDURE — 93005 ELECTROCARDIOGRAM TRACING: CPT

## 2018-09-27 PROCEDURE — 96374 THER/PROPH/DIAG INJ IV PUSH: CPT

## 2018-09-27 PROCEDURE — 96376 TX/PRO/DX INJ SAME DRUG ADON: CPT

## 2018-09-27 PROCEDURE — 73080 X-RAY EXAM OF ELBOW: CPT

## 2018-09-27 PROCEDURE — 99219 PR INITIAL OBSERVATION CARE/DAY 50 MINUTES: CPT | Performed by: PHYSICIAN ASSISTANT

## 2018-09-27 PROCEDURE — 90471 IMMUNIZATION ADMIN: CPT

## 2018-09-27 PROCEDURE — 70450 CT HEAD/BRAIN W/O DYE: CPT

## 2018-09-27 PROCEDURE — 24650 CLTX RDL HEAD/NCK FX WO MNPJ: CPT | Performed by: ORTHOPAEDIC SURGERY

## 2018-09-27 PROCEDURE — 90715 TDAP VACCINE 7 YRS/> IM: CPT | Performed by: EMERGENCY MEDICINE

## 2018-09-27 RX ORDER — DOXAZOSIN MESYLATE 4 MG/1
4 TABLET ORAL
Status: DISCONTINUED | OUTPATIENT
Start: 2018-09-27 | End: 2018-10-02 | Stop reason: HOSPADM

## 2018-09-27 RX ORDER — LIDOCAINE HYDROCHLORIDE 10 MG/ML
10 INJECTION, SOLUTION EPIDURAL; INFILTRATION; INTRACAUDAL; PERINEURAL ONCE
Status: DISCONTINUED | OUTPATIENT
Start: 2018-09-27 | End: 2018-10-02 | Stop reason: HOSPADM

## 2018-09-27 RX ORDER — HEPARIN SODIUM 5000 [USP'U]/ML
5000 INJECTION, SOLUTION INTRAVENOUS; SUBCUTANEOUS EVERY 8 HOURS SCHEDULED
Status: DISCONTINUED | OUTPATIENT
Start: 2018-09-27 | End: 2018-10-02 | Stop reason: HOSPADM

## 2018-09-27 RX ORDER — LEVOTHYROXINE SODIUM 0.07 MG/1
150 TABLET ORAL DAILY
Status: DISCONTINUED | OUTPATIENT
Start: 2018-09-27 | End: 2018-09-28

## 2018-09-27 RX ORDER — ALBUTEROL SULFATE 90 UG/1
1 AEROSOL, METERED RESPIRATORY (INHALATION) EVERY 6 HOURS PRN
Status: DISCONTINUED | OUTPATIENT
Start: 2018-09-27 | End: 2018-10-02 | Stop reason: HOSPADM

## 2018-09-27 RX ORDER — OXYCODONE HCL 5 MG/5 ML
2.5 SOLUTION, ORAL ORAL EVERY 4 HOURS PRN
Status: DISCONTINUED | OUTPATIENT
Start: 2018-09-27 | End: 2018-10-02 | Stop reason: HOSPADM

## 2018-09-27 RX ORDER — METHOCARBAMOL 500 MG/1
500 TABLET, FILM COATED ORAL EVERY 6 HOURS PRN
Status: DISCONTINUED | OUTPATIENT
Start: 2018-09-27 | End: 2018-10-02 | Stop reason: HOSPADM

## 2018-09-27 RX ORDER — ACETAMINOPHEN 325 MG/1
975 TABLET ORAL ONCE
Status: COMPLETED | OUTPATIENT
Start: 2018-09-27 | End: 2018-09-27

## 2018-09-27 RX ORDER — ASPIRIN 81 MG/1
81 TABLET ORAL DAILY
Status: DISCONTINUED | OUTPATIENT
Start: 2018-09-28 | End: 2018-10-02 | Stop reason: HOSPADM

## 2018-09-27 RX ORDER — MORPHINE SULFATE 4 MG/ML
4 INJECTION, SOLUTION INTRAMUSCULAR; INTRAVENOUS ONCE
Status: COMPLETED | OUTPATIENT
Start: 2018-09-27 | End: 2018-09-27

## 2018-09-27 RX ORDER — LOSARTAN POTASSIUM 50 MG/1
100 TABLET ORAL DAILY
Status: DISCONTINUED | OUTPATIENT
Start: 2018-09-28 | End: 2018-10-02 | Stop reason: HOSPADM

## 2018-09-27 RX ORDER — FLUTICASONE PROPIONATE 220 UG/1
2 AEROSOL, METERED RESPIRATORY (INHALATION) EVERY 12 HOURS SCHEDULED
Status: DISCONTINUED | OUTPATIENT
Start: 2018-09-27 | End: 2018-10-02 | Stop reason: HOSPADM

## 2018-09-27 RX ORDER — LIDOCAINE 50 MG/G
1 PATCH TOPICAL DAILY
Status: DISCONTINUED | OUTPATIENT
Start: 2018-09-27 | End: 2018-10-02 | Stop reason: HOSPADM

## 2018-09-27 RX ORDER — CARBAMAZEPINE 200 MG/1
100 TABLET ORAL 4 TIMES DAILY
Status: DISCONTINUED | OUTPATIENT
Start: 2018-09-27 | End: 2018-10-02 | Stop reason: HOSPADM

## 2018-09-27 RX ORDER — LIDOCAINE HYDROCHLORIDE 10 MG/ML
INJECTION, SOLUTION EPIDURAL; INFILTRATION; INTRACAUDAL; PERINEURAL
Status: COMPLETED
Start: 2018-09-27 | End: 2018-09-27

## 2018-09-27 RX ORDER — BUPROPION HYDROCHLORIDE 100 MG/1
100 TABLET, EXTENDED RELEASE ORAL EVERY 12 HOURS SCHEDULED
Status: DISCONTINUED | OUTPATIENT
Start: 2018-09-27 | End: 2018-10-02 | Stop reason: HOSPADM

## 2018-09-27 RX ORDER — HYDROCHLOROTHIAZIDE 12.5 MG/1
12.5 TABLET ORAL DAILY
Status: DISCONTINUED | OUTPATIENT
Start: 2018-09-28 | End: 2018-10-02 | Stop reason: HOSPADM

## 2018-09-27 RX ORDER — ACETAMINOPHEN 325 MG/1
975 TABLET ORAL EVERY 8 HOURS SCHEDULED
Status: DISCONTINUED | OUTPATIENT
Start: 2018-09-27 | End: 2018-10-02 | Stop reason: HOSPADM

## 2018-09-27 RX ORDER — OXYCODONE HYDROCHLORIDE 5 MG/1
5 TABLET ORAL EVERY 4 HOURS PRN
Status: DISCONTINUED | OUTPATIENT
Start: 2018-09-27 | End: 2018-10-02 | Stop reason: HOSPADM

## 2018-09-27 RX ADMIN — CARBAMAZEPINE 100 MG: 200 TABLET ORAL at 22:48

## 2018-09-27 RX ADMIN — MORPHINE SULFATE 4 MG: 4 INJECTION INTRAVENOUS at 14:42

## 2018-09-27 RX ADMIN — BUPROPION HYDROCHLORIDE 100 MG: 100 TABLET, FILM COATED, EXTENDED RELEASE ORAL at 22:47

## 2018-09-27 RX ADMIN — ACETAMINOPHEN 975 MG: 325 TABLET ORAL at 22:46

## 2018-09-27 RX ADMIN — ACETAMINOPHEN 975 MG: 325 TABLET, FILM COATED ORAL at 13:30

## 2018-09-27 RX ADMIN — LIDOCAINE HYDROCHLORIDE: 10 INJECTION, SOLUTION EPIDURAL; INFILTRATION; INTRACAUDAL; PERINEURAL at 15:37

## 2018-09-27 RX ADMIN — LIDOCAINE 1 PATCH: 50 PATCH CUTANEOUS at 22:45

## 2018-09-27 RX ADMIN — TETANUS TOXOID, REDUCED DIPHTHERIA TOXOID AND ACELLULAR PERTUSSIS VACCINE, ADSORBED 0.5 ML: 5; 2.5; 8; 8; 2.5 SUSPENSION INTRAMUSCULAR at 14:41

## 2018-09-27 RX ADMIN — MORPHINE SULFATE 4 MG: 4 INJECTION INTRAVENOUS at 13:43

## 2018-09-27 NOTE — PROCEDURES
Laceration repair  Date/Time: 9/27/2018 4:27 PM  Performed by: Shelly Phoenix  Authorized by: Shelly Phoenix   Consent: Verbal consent obtained    Body area: mouth  Location details: lower lip, interior  Laceration length: 2 cm  Anesthesia: local infiltration    Anesthesia:  Local Anesthetic: lidocaine 1% without epinephrine  Anesthetic total: 2 mL      Procedure Details:  Mucous membrane closure: 3-0 Chromic gut  Number of sutures: 3  Technique: simple  Approximation: close  Patient tolerance: Patient tolerated the procedure well with no immediate complications

## 2018-09-27 NOTE — H&P
H&P Exam - Trauma   Yasir Dye 67 y o  female MRN: [id number]  Unit/Bed#: ED 27 Encounter: [id number]    Assessment/Plan   Trauma Alert: Evaluation  Model of Arrival: Ambulance  Trauma Team: Attending Dr Tess Copeland and Residents Dr Ioana Thomas  Consultants: Orthopaedics: Possible left radial head fracture  Time Called [phone], Returned call: Yes 160    Trauma Active Problem/Plan:    Fall down concrete steps    Right 4-6 rib fractures  - Rib fracture pain protocol  - IS  - OOB    Lip laceration  - Repair w/ chromic    Possible left radial head fracture  - F/u ortho    History of Present Illness   HPI:  Yasir Dye is a 67 y o  female who presents following a mechanical fall down a flight of concrete steps  She denies any LOC  She is GCS 15  CT head and neck was negative  CT chest showing right rib fractures  FAST was done and negative  XR of left elbow showing possible fracture of radial head  Currently she is complaining of right rib and left forearm pain  Mechanism:Fall    Review of Systems   Constitutional: Negative for chills and fever  HENT: Positive for facial swelling and nosebleeds  Eyes: Negative for photophobia and visual disturbance  Respiratory: Negative for chest tightness and shortness of breath  Cardiovascular: Negative for chest pain and palpitations  Gastrointestinal: Negative for abdominal pain, nausea and vomiting  Genitourinary: Positive for flank pain  Negative for dysuria  Musculoskeletal: Negative for back pain and neck pain  Skin: Positive for wound  Negative for rash  Allergic/Immunologic: Negative for environmental allergies and food allergies  Neurological: Negative for dizziness, weakness and light-headedness  All other systems reviewed and are negative        Historical Information     Past Medical History:   Diagnosis Date    Arthritis     Asthma     B[address] disorder [address] [address]     Cyst of left ovary     Depression     Diverticulitis of colon     Hypertension     Hypothyroidism     Rheumatic fever     history of    Umbilical hernia     Uterine leiomyoma      Past Surgical History:   Procedure Laterality Date    ACHILLES TENDON SURGERY      BACK SURGERY      BILATERAL KNEE ARTHROSCOPY      COLONOSCOPY N/A 9/7/2016    Procedure: COLONOSCOPY;  Surgeon: Zane Siddiqi MD;  Location: [facility]; Service:     ENDOMETRIAL BIOPSY      BY SUCTION    GANGLION CYST EXCISION      WRIST    HERNIA REPAIR      UMBILICAL HERNIA HERNIORRHAPHY    MT ERCP W/SPHINCTEROTOMY/PAPILLOTOMY N/A 7/2/2018    Procedure: ENDOSCOPIC RETROGRADE CHOLANGIOPANCREATOGRAPHY (ERCP); Surgeon: Franca Francisco MD;  Location: [facility];   Service: Gastroenterology    REPLACEMENT TOTAL KNEE      SPINE SURGERY      TONSILLECTOMY AND ADENOIDECTOMY       Social History   History   Alcohol Use    Yes     Comment: rarely     History   Drug Use No     History   Smoking Status    Never Smoker   Smokeless Tobacco    Never Used     Immunization History   Administered Date(s) Administered    Tdap 09/27/2018     Last Tetanus: today  Family History: Non-contributory    Meds/Allergies   all current active meds have been reviewed, current meds:   Current Facility-Administered Medications   Medication Dose Route Frequency    acetaminophen (TYLENOL) tablet 975 mg  975 mg Oral Q8H [address]    albuterol (PROVENTIL HFA,VENTOLIN HFA) inhaler 1 puff  1 puff Inhalation Q6H PRN    aspirin (ECOTRIN LOW STRENGTH) EC tablet 81 mg  81 mg Oral Daily    buPROPion (WELLBUTRIN SR) 12 hr tablet 100 mg  100 mg Oral BID    carBAMazepine (TEGretol) tablet 100 mg  100 mg Oral 4x Daily    doxazosin (CARDURA) tablet 4 mg  4 mg Oral HS    fluticasone (FLOVENT HFA) 220 mcg/act inhaler 2 puff  2 puff Inhalation Q12H [address]    heparin (porcine) subcutaneous injection 5,000 Units  5,000 Units Subcutaneous Q8H [address]    [START ON 9/28/2018] levothyroxine tablet 137 mcg  137 mcg Oral Early Morning    levothyroxine tablet 150 mcg  150 mcg Oral Daily    lidocaine (LIDODERM) 5 % patch 1 patch  1 patch Topical Daily    lidocaine (PF) (XYLOCAINE-MPF) 1 % injection 10 mL  10 mL Infiltration Once    methocarbamol (ROBAXIN) tablet 500 mg  500 mg Oral Q6H PRN    oxyCODONE (ROXICODONE) IR tablet 5 mg  5 mg Oral Q4H PRN    oxyCODONE (ROXICODONE) oral solution 2 5 mg  2 5 mg Oral Q4H PRN    valsartan-hydrochlorothiazide (DIOVAN /12  5) combo dose   Oral Daily    verapamil (CALAN-SR) CR tablet 180 mg  180 mg Oral HS    and PTA meds:   Prior to Admission Medications   Prescriptions Last Dose Informant Patient Reported? Taking? ALBUTEROL SULFATE HFA IN 9/27/2018 at Unknown time  Yes Yes   Sig: Inhale as needed   Calcium Carbonate-Vitamin D (CALTRATE 600+D PO) 9/27/2018 at Unknown time Self Yes Yes   Sig: Take 1 tablet by mouth daily  Cholecalciferol (VITAMIN D3) 1000 UNITS CAPS 9/27/2018 at Unknown time Self Yes Yes   Sig: Take 1 capsule by mouth daily  Levothyroxine Sodium 137 MCG CAPS 9/27/2018 at Unknown time Self Yes Yes   Sig: Take 1 tablet by mouth Indications: 1T 5x/week  Omega-3 Fatty Acids (OMEGA-3 FISH OIL PO) 9/27/2018 at Unknown time Self Yes Yes   Sig: Take 1 capsule by mouth daily Indications: 1000-300mg  aspirin (ECOTRIN LOW STRENGTH) 81 mg EC tablet 9/27/2018 at Unknown time Self Yes Yes   Sig: Take 81 mg by mouth daily  buPROPion [facility]) 100 mg 12 hr tablet 9/27/2018 at Unknown time Self Yes Yes   Sig: Take 100 mg by mouth 2 (two) times a day  carBAMazepine (TEGretol) 200 mg tablet 9/27/2018 at Unknown time Self Yes Yes   Sig: Take 100 mg by mouth 4 (four) times a day  doxazosin (CARDURA) 4 mg tablet 9/27/2018 at Unknown time Self Yes Yes   Sig: Take 4 mg by mouth daily at bedtime  fluticasone (FLOVENT HFA) 220 mcg/act inhaler 9/27/2018 at Unknown time  Yes Yes   Sig: Inhale 2 puffs 2 (two) times a day as needed Rinse mouth after use     levothyroxine 150 mcg tablet 9/27/2018 at Unknown time Self Yes Yes   Sig: Take 150 mcg by mouth daily Indications: 1T 2x/week  valsartan-hydrochlorothiazide (DIOVAN-HCT) 320-12 5 MG per tablet 9/27/2018 at Unknown time Self Yes Yes   Sig: Take 1 tablet by mouth daily  verapamil (CALAN-SR) 180 mg CR tablet 9/27/2018 at Unknown time Self Yes Yes   Sig: Take 180 mg by mouth daily at bedtime  Facility-Administered Medications: None       Allergies   Allergen Reactions    Beta Adrenergic Blockers     Sulfa Antibiotics Hives         PHYSICAL EXAM    Objective   Vitals:   First set: Temperature: (!) 96 8 °F (36 °C) (09/27/18 1236)  Pulse: 86 (09/27/18 1236)  Respirations: 20 (09/27/18 1236)  Blood Pressure: 113/58 (09/27/18 1236)    Primary Survey:   (A) Airway: Intact  (B) Breathing: B/l breath sounds  (C) Circulation: Pulses:   normal  (D) Disabliity:  GCS Total:  15  (E) Expose:  Completed    Secondary Survey: (Click on Physical Exam tab above)  Physical Exam   Constitutional: She is oriented to person, place, and time  She appears well-developed and well-nourished  HENT:   Head: Normocephalic and atraumatic  Right Ear: External ear normal    Left Ear: External ear normal    Ecchymosis to nasal bridge   Eyes: Pupils are equal, round, and reactive to light  EOM are normal  Right eye exhibits no discharge  Left eye exhibits no discharge  Neck: Normal range of motion  Tracheal deviation present  Cardiovascular: Normal rate and intact distal pulses  Pulmonary/Chest: Effort normal  No respiratory distress  She exhibits no tenderness  Right lateral chest wall tender to palp   Abdominal: Soft  She exhibits no distension  There is no tenderness  There is no rebound and no guarding  Musculoskeletal: Normal range of motion  She exhibits no edema or deformity  Neurological: She is alert and oriented to person, place, and time  Skin: Skin is warm  Abrasions to b/l UE  Left forearm tender to palp   Psychiatric: She has a normal mood and affect   Her behavior is normal  Judgment and thought content normal    Vitals reviewed  Invasive Devices     Peripheral Intravenous Line            Peripheral IV 09/27/18 Left Antecubital less than 1 day                Lab Results:   Results: I have personally reviewed pertinent reports   , BMP/CMP:   Lab Results   Component Value Date     (L) 09/27/2018    K 3 7 09/27/2018    CL 94 (L) 09/27/2018    CO2 27 09/27/2018    BUN 9 09/27/2018    CREATININE 0 73 09/27/2018    CALCIUM 8 5 09/27/2018    EGFR 83 09/27/2018    and CBC:   Lab Results   Component Value Date    WBC 6 93 09/27/2018    HGB 12 7 09/27/2018    HCT 38 3 09/27/2018    MCV 93 09/27/2018     09/27/2018    MCH 30 9 09/27/2018    MCHC 33 2 09/27/2018    RDW 12 2 09/27/2018    MPV 8 5 (L) 09/27/2018    NRBC 0 09/27/2018     Imaging/EKG Studies:   Xr Elbow 3+ Views Left    Result Date: 9/27/2018  Impression: Questionable nondisplaced articular surface fracture of the radial head  Recommend appropriate clinical management and reimaging in 7-10 days  Small effusion suggested as well  The study was marked in [facility] for immediate notification  Workstation performed: [id number]     Xr Forearm 2 Views Left    Result Date: 9/27/2018  Impression: No acute abnormalities are visible on the study  Please refer to elbow report regarding possible radial head fracture  Degenerative changes of the wrist Workstation performed: [id number]     Ct Head Without Contrast    Result Date: 9/27/2018  Impression: No acute intracranial abnormality  Workstation performed: [id number]     Ct Chest Without Contrast    Result Date: 9/27/2018  Impression: Nondisplaced right anterior 4th through 6th rib fractures  Workstation performed: [id number]     Ct Spine Cervical Without Contrast    Result Date: 9/27/2018  Impression: No cervical spine fracture or traumatic malalignment    Workstation performed: [id number]     Other Studies: FAST is negative    Code Status: Level 1 - Full Code  Advance Directive and Living Will:      Power of :    POLST:

## 2018-09-27 NOTE — ED PROVIDER NOTES
History  Chief Complaint   Patient presents with    Fall     pt fell down a flight of cement steps  denies CP, SOB, dizziness  pt has laceration to inside of lower lip, abrasions to bilateral arms  57-year-old female presents to the emergency department after a mechanical fall down approximately 10 concrete steps by her house  Patient states she did not raise her arms in time to brace herself and landed on her face  She then slid down the rest this steps she is not on any blood thinner she stop taking at baby aspirin 1 week ago she denies any loss of consciousness remembers the whole event, called for help and was brought to the emergency department  The patient has Complaint of nasal pain and swelling she also complains of right-sided chest pain below her right breast   Patient has complains of pain in her left elbow/forearm and right forearm however states that is due to the abrasions on the right forearm unsure on the left feels as though it is inside  She has no nausea or vomiting she denies any other symptoms she has not taken anything for pain  Remaining ROS negative          History provided by:  Patient   used: No    Fall   Mechanism of injury: fall    Injury location:  Head/neck  Head/neck injury location:  Head  Incident location:  Home  Arrived directly from scene: yes    Fall:     Fall occurred:  Down stairs    Impact surface:  Wright    Point of impact:  Face    Entrapped after fall: no    Suspicion of alcohol use: no    Suspicion of drug use: no    Prior to arrival data:     Bystander interventions:  None    Patient ambulatory at scene: yes      Blood loss:  Minimal    Responsiveness at scene:  Alert    Orientation at scene:  Person, place, situation and time    Loss of consciousness: no      Amnesic to event: no      Airway interventions:  None    Medications administered:  None    Immobilization:  C-collar  Associated symptoms: no abdominal pain, no chest pain, no nausea and no vomiting        Prior to Admission Medications   Prescriptions Last Dose Informant Patient Reported? Taking? ALBUTEROL SULFATE HFA IN 9/27/2018 at Unknown time  Yes Yes   Sig: Inhale as needed   Calcium Carbonate-Vitamin D (CALTRATE 600+D PO) 9/27/2018 at Unknown time Self Yes Yes   Sig: Take 1 tablet by mouth daily  Cholecalciferol (VITAMIN D3) 1000 UNITS CAPS 9/27/2018 at Unknown time Self Yes Yes   Sig: Take 1 capsule by mouth daily  Levothyroxine Sodium 137 MCG CAPS 9/27/2018 at Unknown time Self Yes Yes   Sig: Take 1 tablet by mouth Indications: 1T 5x/week  Omega-3 Fatty Acids (OMEGA-3 FISH OIL PO) 9/27/2018 at Unknown time Self Yes Yes   Sig: Take 1 capsule by mouth daily Indications: 1000-300mg  aspirin (ECOTRIN LOW STRENGTH) 81 mg EC tablet 9/27/2018 at Unknown time Self Yes Yes   Sig: Take 81 mg by mouth daily  buPROPion [facility]) 100 mg 12 hr tablet 9/27/2018 at Unknown time Self Yes Yes   Sig: Take 100 mg by mouth 2 (two) times a day  carBAMazepine (TEGretol) 200 mg tablet 9/27/2018 at Unknown time Self Yes Yes   Sig: Take 100 mg by mouth 4 (four) times a day  doxazosin (CARDURA) 4 mg tablet 9/27/2018 at Unknown time Self Yes Yes   Sig: Take 4 mg by mouth daily at bedtime  fluticasone (FLOVENT HFA) 220 mcg/act inhaler 9/27/2018 at Unknown time  Yes Yes   Sig: Inhale 2 puffs 2 (two) times a day as needed Rinse mouth after use  levothyroxine 150 mcg tablet 9/27/2018 at Unknown time Self Yes Yes   Sig: Take 150 mcg by mouth daily Indications: 1T 2x/week  valsartan-hydrochlorothiazide (DIOVAN-HCT) 320-12 5 MG per tablet 9/27/2018 at Unknown time Self Yes Yes   Sig: Take 1 tablet by mouth daily  verapamil (CALAN-SR) 180 mg CR tablet 9/27/2018 at Unknown time Self Yes Yes   Sig: Take 180 mg by mouth daily at bedtime        Facility-Administered Medications: None       Past Medical History:   Diagnosis Date    Arthritis     Asthma     Bipolar disorder ([address] )     Cyst of left ovary     Depression     Diverticulitis of colon     Hypertension     Hypothyroidism     Rheumatic fever     history of    Umbilical hernia     Uterine leiomyoma        Past Surgical History:   Procedure Laterality Date    ACHILLES TENDON SURGERY      BACK SURGERY      BILATERAL KNEE ARTHROSCOPY      COLONOSCOPY N/A 9/7/2016    Procedure: COLONOSCOPY;  Surgeon: Elena Jain MD;  Location: [facility]; Service:     ENDOMETRIAL BIOPSY      BY SUCTION    GANGLION CYST EXCISION      WRIST    HERNIA REPAIR      UMBILICAL HERNIA HERNIORRHAPHY    MI ERCP W/SPHINCTEROTOMY/PAPILLOTOMY N/A 7/2/2018    Procedure: ENDOSCOPIC RETROGRADE CHOLANGIOPANCREATOGRAPHY (ERCP); Surgeon: Trini Joshi MD;  Location: [facility]; Service: Gastroenterology    REPLACEMENT TOTAL KNEE      SPINE SURGERY      TONSILLECTOMY AND ADENOIDECTOMY         Family History   Problem Relation Age of Onset    Breast cancer Mother 47    Other Mother         SEPSIS    Thyroid cancer Mother     Other Father         SEPSIS     Diabetes Sister     Heart disease Sister     Heart disease Brother         CABG     Breast cancer Maternal Aunt     Breast cancer Family         4 AUNTS      I have reviewed and agree with the history as documented  Social History   Substance Use Topics    Smoking status: Never Smoker    Smokeless tobacco: Never Used    Alcohol use Yes      Comment: rarely        Review of Systems   Constitutional: Negative for chills and fever  HENT: Negative for sore throat  Eyes: Negative for visual disturbance  Respiratory: Negative for chest tightness, shortness of breath and wheezing  Cardiovascular: Negative for chest pain  Gastrointestinal: Negative for abdominal pain, constipation, diarrhea, nausea and vomiting  Genitourinary: Negative for dysuria and hematuria  Musculoskeletal: Negative for arthralgias and myalgias  Skin: Positive for wound  Negative for color change  Neurological: Negative for light-headedness  Hematological: Negative for adenopathy  Psychiatric/Behavioral: Negative for agitation and behavioral problems  All other systems reviewed and are negative  Physical Exam  ED Triage Vitals [09/27/18 1236]   Temperature Pulse Respirations Blood Pressure SpO2   (!) 96 8 °F (36 °C) 86 20 113/58 95 %      Temp Source Heart Rate Source Patient Position - Orthostatic VS BP Location FiO2 (%)   Tympanic Monitor Sitting Left arm --      Pain Score       Worst Possible Pain           Orthostatic Vital Signs  Vitals:    09/27/18 1614 09/27/18 1917 09/27/18 2000 09/27/18 2030   BP: 123/68 152/67 129/61 123/68   Pulse: 62 60 58 61   Patient Position - Orthostatic VS: Lying Lying         Physical Exam   Constitutional: She is oriented to person, place, and time  She appears well-developed and well-nourished  No distress  HENT:   Head: Normocephalic  Head is with abrasion and with contusion  Head is without raccoon's eyes and without Gaspar's sign  Nose: Sinus tenderness present  No septal deviation or nasal septal hematoma  Eyes: Conjunctivae and EOM are normal  No scleral icterus  Neck: Normal range of motion  Neck supple  Cardiovascular: Normal rate, regular rhythm and normal heart sounds  No murmur heard  Pulmonary/Chest: Effort normal and breath sounds normal  No respiratory distress  Abdominal: Soft  Bowel sounds are normal  There is no tenderness  Musculoskeletal: Normal range of motion  Left forearm: She exhibits tenderness  Arms:  Abrasions on bilateral forearms and tenderness to left elbow  Neurological: She is alert and oriented to person, place, and time  Skin: Skin is warm and dry  Rash noted  Psychiatric: She has a normal mood and affect  Her behavior is normal    Nursing note and vitals reviewed        ED Medications  Medications   lidocaine (PF) (XYLOCAINE-MPF) 1 % injection 10 mL (10 mL Infiltration Not Given 9/27/18 1537)   heparin (porcine) subcutaneous injection 5,000 Units (not administered)   acetaminophen (TYLENOL) tablet 975 mg (not administered)   lidocaine (LIDODERM) 5 % patch 1 patch (not administered)   oxyCODONE (ROXICODONE) oral solution 2 5 mg (not administered)   oxyCODONE (ROXICODONE) IR tablet 5 mg (not administered)   methocarbamol (ROBAXIN) tablet 500 mg (not administered)   aspirin (ECOTRIN LOW STRENGTH) EC tablet 81 mg (not administered)   buPROPion (WELLBUTRIN SR) 12 hr tablet 100 mg (not administered)   carBAMazepine (TEGretol) tablet 100 mg (not administered)   doxazosin (CARDURA) tablet 4 mg (not administered)   fluticasone (FLOVENT HFA) 220 mcg/act inhaler 2 puff (not administered)   levothyroxine tablet 150 mcg (not administered)   levothyroxine tablet 137 mcg (not administered)   valsartan-hydrochlorothiazide (DIOVAN /12  5) combo dose (not administered)   verapamil (CALAN-SR) CR tablet 180 mg (not administered)   albuterol (PROVENTIL HFA,VENTOLIN HFA) inhaler 1 puff (not administered)   acetaminophen (TYLENOL) tablet 975 mg (975 mg Oral Given 9/27/18 1330)   morphine (PF) 4 mg/mL injection 4 mg (4 mg Intravenous Given 9/27/18 1343)   tetanus-diphtheria-acellular pertussis (BOOSTRIX) IM injection 0 5 mL (0 5 mL Intramuscular Given 9/27/18 1441)   morphine (PF) 4 mg/mL injection 4 mg (4 mg Intravenous Given 9/27/18 1442)   lidocaine (PF) (XYLOCAINE-MPF) 1 % injection **AcuDose Override Pull** (  Given by Other 9/27/18 1537)       Diagnostic Studies  Results Reviewed     Procedure Component Value Units Date/Time    Platelet count [[id number]]     Lab Status:  No result Specimen:  Blood     Basic metabolic panel [[id number]]  (Abnormal) Collected:  09/27/18 1343    Lab Status:  Final result Specimen:  Blood from Arm, Left Updated:  09/27/18 1413     Sodium 128 (L) mmol/L      Potassium 3 7 mmol/L      Chloride 94 (L) mmol/L      CO2 27 mmol/L      ANION GAP 7 mmol/L      BUN 9 mg/dL      Creatinine 0 73 mg/dL      Glucose 109 mg/dL      Calcium 8 5 mg/dL      eGFR 83 ml/min/1 73sq m     Narrative:         National Kidney Disease Education Program recommendations are as follows:  GFR calculation is accurate only with a steady state creatinine  Chronic Kidney disease less than 60 ml/min/1 73 sq  meters  Kidney failure less than 15 ml/min/1 73 sq  meters  CBC and differential [[id number]]  (Abnormal) Collected:  09/27/18 1343    Lab Status:  Final result Specimen:  Blood from Arm, Left Updated:  09/27/18 1359     WBC 6 93 Thousand/uL      RBC 4 11 Million/uL      Hemoglobin 12 7 g/dL      Hematocrit 38 3 %      MCV 93 fL      MCH 30 9 pg      MCHC 33 2 g/dL      RDW 12 2 %      MPV 8 5 (L) fL      Platelets 263 Thousands/uL      nRBC 0 /100 WBCs      Neutrophils Relative 71 %      Immat GRANS % 1 %      Lymphocytes Relative 19 %      Monocytes Relative 7 %      Eosinophils Relative 2 %      Basophils Relative 0 %      Neutrophils Absolute 4 90 Thousands/µL      Immature Grans Absolute 0 06 Thousand/uL      Lymphocytes Absolute 1 29 Thousands/µL      Monocytes Absolute 0 51 Thousand/µL      Eosinophils Absolute 0 14 Thousand/µL      Basophils Absolute 0 03 Thousands/µL                  CT chest without contrast   Final Result by Lord Catrina MD (09/27 1420)      Nondisplaced right anterior 4th through 6th rib fractures  Workstation performed: [id number]         XR forearm 2 views LEFT   Final Result by Mayuri Angel MD (09/27 3664)      No acute abnormalities are visible on the study  Please refer to elbow report regarding possible radial head fracture  Degenerative changes of the wrist            Workstation performed: [id number]         XR elbow 3+ views LEFT   Final Result by Mayuri Angel MD (09/27 3445)      Questionable nondisplaced articular surface fracture of the radial head  Recommend appropriate clinical management and reimaging in 7-10 days    Small effusion suggested as well       The study was marked in EPIC for immediate notification  Workstation performed: [id number]         CT spine cervical without contrast   Final Result by Shiv Thomas MD (09/27 1413)      No cervical spine fracture or traumatic malalignment  Workstation performed: [id number]         CT head without contrast   Final Result by Shiv Thomas MD (09/27 1407)      No acute intracranial abnormality  Workstation performed: [id number]               Procedures  ECG 12 Lead Documentation  Date/Time: 9/27/2018 8:50 PM  Performed by: Lavern Chow by: Keon Macdonald     ECG reviewed by me, the ED Provider: yes    Patient location:  ED  Previous ECG:     Previous ECG:  Compared to current    Similarity:  No change    Comparison to cardiac monitor: Yes    Interpretation:     Interpretation: normal    Rate:     ECG rate assessment: normal    Rhythm:     Rhythm: sinus rhythm    Ectopy:     Ectopy: none    QRS:     QRS axis:  Normal  Conduction:     Conduction: normal    ST segments:     ST segments:  Normal  T waves:     T waves: normal            Phone Consults  ED Phone Contact    ED Course  ED Course as of Sep 27 2051   Thu Sep 27, 2018   1416 Sodium: (!) 128                               MDM  Number of Diagnoses or Management Options  Fall, initial encounter: new and requires workup  Rib fractures: new and requires workup  Diagnosis management comments: 61-year-old female who fell at home, concern for multiple fractures, will of turn CT head and neck as well as x-ray of the left elbow  Patient found to have multiple rib fractures, discussed patient with Trauma surgery who will admit patient to their service and will close laceration of patient's lip  Orthopedics will also be consulted for possible fracture of patient's left elbow         Amount and/or Complexity of Data Reviewed  Tests in the radiology section of CPT®: ordered and reviewed  Review and summarize past medical records: yes  Discuss the patient with other providers: yes  Independent visualization of images, tracings, or specimens: yes      CritCare Time    Disposition  Final diagnoses:   Fall, initial encounter   Rib fractures     Time reflects when diagnosis was documented in both MDM as applicable and the Disposition within this note     Time User Action Codes Description Comment    9/27/2018  2:34 PM Crow Nuñez Add [B86  JWKT] Fall, initial encounter     9/27/2018  2:34 PM Jennielogan Joaquin Add [S22 39XA] Rib fractures     9/27/2018  3:39 PM Veronica Matar Add [S52 125A] Closed nondisplaced fracture of head of left radius, initial encounter     9/27/2018  4:28 PM Veronica Matar Add [S01 511A] Lip laceration, initial encounter       ED Disposition     ED Disposition Condition Comment    Admit  Case was discussed with Trauma and the patient's admission status was agreed to be Admission Status: observation status to the service of Dr Ruby Bland   Follow-up Information    None         Patient's Medications   Discharge Prescriptions    No medications on file     No discharge procedures on file  ED Provider  Attending physically available and evaluated Sanju Ahuja I managed the patient along with the ED Attending      Electronically Signed by         Faye Camacho MD  09/27/18 2751

## 2018-09-27 NOTE — ED ATTENDING ATTESTATION
Alison North MD, saw and evaluated the patient  I have discussed the patient with the resident/non-physician practitioner and agree with the resident's/non-physician practitioner's findings, Plan of Care, and MDM as documented in the resident's/non-physician practitioner's note, except where noted  All available labs and Radiology studies were reviewed  At this point I agree with the current assessment done in the Emergency Department  I have conducted an independent evaluation of this patient a history and physical is as follows: This is evaluation of a 70-year-old female status post mechanical fall  Patient states that she was in a rush, through on some clothes and then was running down steps when she lost her balance and fell  Patient states she fell down at least 5 outside, concrete steps  Denies blood thinners or LOC  She does complain of left elbow and forearm pain as well as facial pain and mild headache  No dizziness  No chest pain  No shortness of breath but she does have significant pain over her bilateral anterior/ lateral ribs  No nausea no vomiting  No abdominal or back pain  No focal numbness weakness or tingling  On physical exam patient is not uncomfortable appearing female but nontoxic  Vital signs stable  Patient has ecchymosis with some mild swelling over her nasal bridge  She also has a stellate laceration that is deep on the lower inside portion of her lip there is no bleeding  Pupils equal round reactive to light no nystagmus  No hemotympanum  It is not through and through  Patient does have some mild ecchymosis/ abrasion to her chin  Neck nontender no midline tenderness to palpation no step-off or deformity  Heart regular rate  Lungs clear to auscultation bilaterally  Positive tenderness to palpation over the right anterior lateral upper ribs as well as mild tender to palpation over left ribs    Abdomen soft nontender nondistended positive bowel sounds no rebound or guarding  Positive ecchymosis and abrasion over left elbow /forearm with tenderness to palpation and pain with range of motion  Intact distal pulses and capillary refill less than 2 seconds  No lower extremity deformities  No edema  Awake alert oriented  Assessment and plan mechanical fall with concern for fracture to left upper extremity as well as possible nasal bridge fracture  Also concern for rib fractures  Will obtain imaging to rule out traumatic injury  Will monitor patient on telemetry  Will obtain EKG  Will treat pain  Will likely require admission  Portions of the record may have been created with voice recognition software  Occasional wrong word or sound-a-like" substitutions may have occurred due to the inherent limitations of voice recognition software  Review chart carefully and recognize, using context, where substitutions have occurred      Critical Care Time  CritCare Time    Procedures

## 2018-09-27 NOTE — CONSULTS
Orthopedics   Franca Bunch 67 y o  female MRN: [id number]  Unit/Bed#: X ray      Chief Complaint:   Left elbow pain    HPI:  67 y  o female complaining of left elbow pain  Patient tripped and fell down a set of concrete stairs  She admits to hitting her head but denies any loss of consciousness  She complains of left elbow pain which is well localized, worse with extremes of flexion or extension and improves with rest    She denies any numbness or tingling of the left upper extremity  Patient's other injuries include a lip laceration and a right sided rib fractures  She has no other orthopedic complaints at this time    Review Of Systems:   · Skin:   Superficial abrasions to the volar aspect of the left forearm  · Neuro: See HPI  · Musculoskeletal: See HPI  · 14 point review of systems negative except as stated above     Past Medical History:   Past Medical History:   Diagnosis Date    Arthritis     Asthma     Bipolar disorder ([address] )     Cyst of left ovary     Depression     Diverticulitis of colon     Hypertension     Hypothyroidism     Rheumatic fever     history of    Umbilical hernia     Uterine leiomyoma        Past Surgical History:   Past Surgical History:   Procedure Laterality Date    ACHILLES TENDON SURGERY      BACK SURGERY      BILATERAL KNEE ARTHROSCOPY      COLONOSCOPY N/A 9/7/2016    Procedure: COLONOSCOPY;  Surgeon: Britta Jane MD;  Location: [facility]; Service:     ENDOMETRIAL BIOPSY      BY SUCTION    GANGLION CYST EXCISION      WRIST    HERNIA REPAIR      UMBILICAL HERNIA HERNIORRHAPHY    OR ERCP W/SPHINCTEROTOMY/PAPILLOTOMY N/A 7/2/2018    Procedure: ENDOSCOPIC RETROGRADE CHOLANGIOPANCREATOGRAPHY (ERCP); Surgeon: Jamaal Bower MD;  Location: [facility];   Service: Gastroenterology    REPLACEMENT TOTAL KNEE      SPINE SURGERY      TONSILLECTOMY AND ADENOIDECTOMY         Family History:  Family history reviewed and non-contributory  Family History   Problem Relation Age of Onset    Breast cancer Mother 47    Other Mother         SEPSIS    Thyroid cancer Mother     Other Father         SEPSIS     Diabetes Sister     Heart disease Sister     Heart disease Brother         CABG     Breast cancer Maternal Aunt     Breast cancer Family         4 AUNTS        Social History:  Social History     Social History    Marital status: /Civil Union     Spouse name: N/A    Number of children: N/A    Years of education: N/A     Occupational History    retired      Social History Main Topics    Smoking status: Never Smoker    Smokeless tobacco: Never Used    Alcohol use Yes      Comment: rarely    Drug use: No    Sexual activity: No     Other Topics Concern    None     Social History Narrative    None       Allergies:    Allergies   Allergen Reactions    Beta Adrenergic Blockers     Sulfa Antibiotics Hives           Labs:    0  Lab Value Date/Time   HCT 38 3 09/27/2018 1343   HGB 12 7 09/27/2018 1343   WBC 6 93 09/27/2018 1343       Meds:    Current Facility-Administered Medications:     acetaminophen (TYLENOL) tablet 975 mg, 975 mg, Oral, Q8H [address], Derrell Schaefer MD    albuterol (PROVENTIL HFA,VENTOLIN HFA) inhaler 1 puff, 1 puff, Inhalation, Q6H PRN, Derrell Schaefer MD    aspirin (ECOTRIN LOW STRENGTH) EC tablet 81 mg, 81 mg, Oral, Daily, Derrell Schaefer MD    buPROPion [facility] SR) 12 hr tablet 100 mg, 100 mg, Oral, BID, Derrell Schaefer MD    carBAMazepine (TEGretol) tablet 100 mg, 100 mg, Oral, 4x Daily, Derrell Schaefer MD    doxazosin (CARDURA) tablet 4 mg, 4 mg, Oral, HS, Derrell Schaefer MD    fluticasone (FLOVENT HFA) 220 mcg/act inhaler 2 puff, 2 puff, Inhalation, Q12H [address], Derrell Schaefer MD    heparin (porcine) subcutaneous injection 5,000 Units, 5,000 Units, Subcutaneous, Q8H [address] **AND** Platelet count, , , Once, Derrell Schaefer MD  Galilea Hancock  [START ON 9/28/2018] levothyroxine tablet 137 mcg, 137 mcg, Oral, Early Morning, Jeff Zepeda Jaswant Lechuga MD    levothyroxine tablet 150 mcg, 150 mcg, Oral, Daily, Wesley Levels, MD    lidocaine (LIDODERM) 5 % patch 1 patch, 1 patch, Topical, Daily, Wesley Levels, MD    lidocaine (PF) (XYLOCAINE-MPF) 1 % injection 10 mL, 10 mL, Infiltration, Once, Wesley Levels, MD    methocarbamol (ROBAXIN) tablet 500 mg, 500 mg, Oral, Q6H PRN, Wesley Levels, MD    oxyCODONE (ROXICODONE) IR tablet 5 mg, 5 mg, Oral, Q4H PRN, Wesley Levels, MD    oxyCODONE (ROXICODONE) oral solution 2 5 mg, 2 5 mg, Oral, Q4H PRN, Wesley Levels, MD    valsartan-hydrochlorothiazide (DIOVAN /12  5) combo dose, , Oral, Daily, Wesley Levels, MD    verapamil (CALAN-SR) CR tablet 180 mg, 180 mg, Oral, HS, Wesley Levels, MD    Current Outpatient Prescriptions:     ALBUTEROL SULFATE HFA IN, Inhale as needed, Disp: , Rfl:     aspirin (ECOTRIN LOW STRENGTH) 81 mg EC tablet, Take 81 mg by mouth daily  , Disp: , Rfl:     buPROPion (WELLBUTRIN SR) 100 mg 12 hr tablet, Take 100 mg by mouth 2 (two) times a day , Disp: , Rfl:     Calcium Carbonate-Vitamin D (CALTRATE 600+D PO), Take 1 tablet by mouth daily  , Disp: , Rfl:     carBAMazepine (TEGretol) 200 mg tablet, Take 100 mg by mouth 4 (four) times a day , Disp: , Rfl:     Cholecalciferol (VITAMIN D3) 1000 UNITS CAPS, Take 1 capsule by mouth daily  , Disp: , Rfl:     doxazosin (CARDURA) 4 mg tablet, Take 4 mg by mouth daily at bedtime  , Disp: , Rfl:     fluticasone (FLOVENT HFA) 220 mcg/act inhaler, Inhale 2 puffs 2 (two) times a day as needed Rinse mouth after use , Disp: , Rfl:     levothyroxine 150 mcg tablet, Take 150 mcg by mouth daily Indications: 1T 2x/week , Disp: , Rfl:     Levothyroxine Sodium 137 MCG CAPS, Take 1 tablet by mouth Indications: 1T 5x/week , Disp: , Rfl:     Omega-3 Fatty Acids (OMEGA-3 FISH OIL PO), Take 1 capsule by mouth daily Indications: 1000-300mg , Disp: , Rfl:     valsartan-hydrochlorothiazide (DIOVAN-HCT) 320-12 5 MG per tablet, Take 1 tablet by mouth daily  , Disp: , Rfl:     verapamil (CALAN-SR) 180 mg CR tablet, Take 180 mg by mouth daily at bedtime  , Disp: , Rfl:     Blood Culture:   No results found for: BLOODCX    Wound Culture:   No results found for: WOUNDCULT    Ins and Outs:  No intake/output data recorded  Physical Exam:   /68 (BP Location: Right arm)   Pulse 62   Temp (!) 96 8 °F (36 °C) (Tympanic)   Resp 20   SpO2 97%   Gen: Alert and oriented to person, place, time  HEENT: EOMI, eyes clear, moist mucus membranes, hearing intact  Respiratory: Bilateral chest rise  No audible wheezing found  Cardiovascular: Regular Rate   Abdomen: soft nontender/nondistended  Musculoskeletal: left upper extremity  · Skin: superficial abrasions as noted above  · TTP over medial condyle of the humerus  · Full active & passive ROM at the elbow  · No mechanical blocks with supination or pronation  · Stable to varus and valgus stress   · SILT m/r/u    · Motor intact ain/pin/m/r/u,   · 2+ rad pulse      Tertiary: no tenderness over all other joints/long bones as except already stated  Radiology:   I personally reviewed the films  Radiographs of the left elbow reveal a nondisplaced radial head fracture    _*_*_*_*_*_*_*_*_*_*_*_*_*_*_*_*_*_*_*_*_*_*_*_*_*_*_*_*_*_*_*_*_*_*_*_*_*_*_*_*_*    Assessment:  67 y  o female status post fall down multiple stairs with a left nondisplaced radial head fracture     Plan:   · NWB left upper extremity in a posterior slab splint and sling  · Begin range of motion exercises at 1 week  · Follow-up with Dr Ayse Major in 2 weeks  · No indications for operative intervention this time  · Pain control per primary team   · Dispo: Ortho will follow    Abelardo Whitehead MD

## 2018-09-27 NOTE — RESPIRATORY THERAPY NOTE
RT Protocol Note  Chapis Baumann 67 y o  female MRN: [id number]  Unit/Bed#: ED 27 Encounter: [id number]    Assessment    Active Problems:    * No active hospital problems  *      Home Pulmonary Medications:  n/a       Past Medical History:   Diagnosis Date    Arthritis     Asthma     Bipolar disorder ([address] )     Cyst of left ovary     Depression     Diverticulitis of colon     Hypertension     Hypothyroidism     Rheumatic fever     history of    Umbilical hernia     Uterine leiomyoma      Social History     Social History    Marital status: /Civil Union     Spouse name: N/A    Number of children: N/A    Years of education: N/A     Occupational History    retired      Social History Main Topics    Smoking status: Never Smoker    Smokeless tobacco: Never Used    Alcohol use Yes      Comment: rarely    Drug use: No    Sexual activity: No     Other Topics Concern    None     Social History Narrative    None       Subjective         Objective    Physical Exam:   Assessment Type: (P) During-treatment  General Appearance: (P) Awake  Respiratory Pattern: (P) Normal  Chest Assessment: (P) Chest expansion symmetrical  Bilateral Breath Sounds: (P) Diminished    Vitals:  Blood pressure 123/68, pulse 62, temperature (!) 96 8 °F (36 °C), temperature source Tympanic, resp  rate 20, SpO2 97 %  Imaging and other studies: I have personally reviewed pertinent reports              Plan       Airway Clearance Plan: (P) Incentive Spirometer     Resp Comments: (P) pt given I/S per rib fracture protocol, pt stated she was in pain doing I/S, pt achieved 250 cc on I/S, will continue to monitor and  pt,

## 2018-09-27 NOTE — ED NOTES
c-collar applied in triage, new ice pack applied to mouth   Pt c/o neck pain, and states she  "already has a bad neck " also c/o right sided rib pain       Jaja Hankins, LON  09/27/18 8107

## 2018-09-28 ENCOUNTER — APPOINTMENT (OUTPATIENT)
Dept: RADIOLOGY | Facility: HOSPITAL | Age: 73
DRG: 184 | End: 2018-09-28
Payer: COMMERCIAL

## 2018-09-28 PROBLEM — E87.1 HYPONATREMIA: Status: ACTIVE | Noted: 2018-09-28

## 2018-09-28 PROBLEM — S52.122A LEFT RADIAL HEAD FRACTURE: Status: ACTIVE | Noted: 2018-09-28

## 2018-09-28 PROBLEM — S01.511A COMPLICATED LACERATION OF LIP: Status: ACTIVE | Noted: 2018-09-28

## 2018-09-28 PROBLEM — S22.41XD CLOSED FRACTURE OF MULTIPLE RIBS OF RIGHT SIDE WITH ROUTINE HEALING: Status: ACTIVE | Noted: 2018-09-28
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
## 2018-09-28 LAB
ANION GAP SERPL CALCULATED.3IONS-SCNC: 7 MMOL/L (ref 4–13)
BASOPHILS # BLD AUTO: 0.03 THOUSANDS/ΜL (ref 0–0.1)
BASOPHILS NFR BLD AUTO: 1 % (ref 0–1)
BUN SERPL-MCNC: 10 MG/DL (ref 5–25)
CALCIUM SERPL-MCNC: 8.5 MG/DL (ref 8.3–10.1)
CHLORIDE SERPL-SCNC: 96 MMOL/L (ref 100–108)
CO2 SERPL-SCNC: 25 MMOL/L (ref 21–32)
CREAT SERPL-MCNC: 0.79 MG/DL (ref 0.6–1.3)
EOSINOPHIL # BLD AUTO: 0.1 THOUSAND/ΜL (ref 0–0.61)
EOSINOPHIL NFR BLD AUTO: 2 % (ref 0–6)
ERYTHROCYTE [DISTWIDTH] IN BLOOD BY AUTOMATED COUNT: 12.1 % (ref 11.6–15.1)
GFR SERPL CREATININE-BSD FRML MDRD: 75 ML/MIN/1.73SQ M
GLUCOSE SERPL-MCNC: 145 MG/DL (ref 65–140)
HCT VFR BLD AUTO: 37.3 % (ref 34.8–46.1)
HGB BLD-MCNC: 12.4 G/DL (ref 11.5–15.4)
IMM GRANULOCYTES # BLD AUTO: 0.02 THOUSAND/UL (ref 0–0.2)
IMM GRANULOCYTES NFR BLD AUTO: 0 % (ref 0–2)
LYMPHOCYTES # BLD AUTO: 0.84 THOUSANDS/ΜL (ref 0.6–4.47)
LYMPHOCYTES NFR BLD AUTO: 14 % (ref 14–44)
MCH RBC QN AUTO: 30.7 PG (ref 26.8–34.3)
MCHC RBC AUTO-ENTMCNC: 33.2 G/DL (ref 31.4–37.4)
MCV RBC AUTO: 92 FL (ref 82–98)
MONOCYTES # BLD AUTO: 0.67 THOUSAND/ΜL (ref 0.17–1.22)
MONOCYTES NFR BLD AUTO: 11 % (ref 4–12)
NEUTROPHILS # BLD AUTO: 4.47 THOUSANDS/ΜL (ref 1.85–7.62)
NEUTS SEG NFR BLD AUTO: 72 % (ref 43–75)
NRBC BLD AUTO-RTO: 0 /100 WBCS
PLATELET # BLD AUTO: 230 THOUSANDS/UL (ref 149–390)
PMV BLD AUTO: 8.1 FL (ref 8.9–12.7)
POTASSIUM SERPL-SCNC: 4 MMOL/L (ref 3.5–5.3)
RBC # BLD AUTO: 4.04 MILLION/UL (ref 3.81–5.12)
SODIUM SERPL-SCNC: 128 MMOL/L (ref 136–145)
WBC # BLD AUTO: 6.13 THOUSAND/UL (ref 4.31–10.16)

## 2018-09-28 PROCEDURE — 99024 POSTOP FOLLOW-UP VISIT: CPT | Performed by: ORTHOPAEDIC SURGERY

## 2018-09-28 PROCEDURE — 71045 X-RAY EXAM CHEST 1 VIEW: CPT

## 2018-09-28 PROCEDURE — 97163 PT EVAL HIGH COMPLEX 45 MIN: CPT

## 2018-09-28 PROCEDURE — G8979 MOBILITY GOAL STATUS: HCPCS

## 2018-09-28 PROCEDURE — G8978 MOBILITY CURRENT STATUS: HCPCS

## 2018-09-28 PROCEDURE — 99225 PR SBSQ OBSERVATION CARE/DAY 25 MINUTES: CPT | Performed by: PHYSICIAN ASSISTANT

## 2018-09-28 PROCEDURE — 85025 COMPLETE CBC W/AUTO DIFF WBC: CPT | Performed by: PHYSICIAN ASSISTANT

## 2018-09-28 PROCEDURE — 80048 BASIC METABOLIC PNL TOTAL CA: CPT | Performed by: PHYSICIAN ASSISTANT

## 2018-09-28 RX ADMIN — VERAPAMIL HYDROCHLORIDE 180 MG: 180 TABLET, FILM COATED, EXTENDED RELEASE ORAL at 21:31

## 2018-09-28 RX ADMIN — OXYCODONE HYDROCHLORIDE 5 MG: 5 TABLET ORAL at 01:08

## 2018-09-28 RX ADMIN — CARBAMAZEPINE 100 MG: 200 TABLET ORAL at 08:19

## 2018-09-28 RX ADMIN — DOXAZOSIN 4 MG: 4 TABLET ORAL at 21:36

## 2018-09-28 RX ADMIN — OXYCODONE HYDROCHLORIDE 5 MG: 5 TABLET ORAL at 23:30

## 2018-09-28 RX ADMIN — LEVOTHYROXINE SODIUM 137 MCG: 112 TABLET ORAL at 06:19

## 2018-09-28 RX ADMIN — HEPARIN SODIUM 5000 UNITS: 5000 INJECTION, SOLUTION INTRAVENOUS; SUBCUTANEOUS at 21:35

## 2018-09-28 RX ADMIN — CARBAMAZEPINE 100 MG: 200 TABLET ORAL at 21:39

## 2018-09-28 RX ADMIN — ACETAMINOPHEN 975 MG: 325 TABLET ORAL at 21:37

## 2018-09-28 RX ADMIN — ACETAMINOPHEN 975 MG: 325 TABLET ORAL at 06:33

## 2018-09-28 RX ADMIN — HYDROCHLOROTHIAZIDE 12.5 MG: 12.5 TABLET ORAL at 08:20

## 2018-09-28 RX ADMIN — BUPROPION HYDROCHLORIDE 100 MG: 100 TABLET, FILM COATED, EXTENDED RELEASE ORAL at 08:19

## 2018-09-28 RX ADMIN — CARBAMAZEPINE 100 MG: 200 TABLET ORAL at 17:11

## 2018-09-28 RX ADMIN — HEPARIN SODIUM 5000 UNITS: 5000 INJECTION, SOLUTION INTRAVENOUS; SUBCUTANEOUS at 14:17

## 2018-09-28 RX ADMIN — LOSARTAN POTASSIUM 100 MG: 50 TABLET ORAL at 08:19

## 2018-09-28 RX ADMIN — BUPROPION HYDROCHLORIDE 100 MG: 100 TABLET, FILM COATED, EXTENDED RELEASE ORAL at 21:31

## 2018-09-28 RX ADMIN — CARBAMAZEPINE 100 MG: 200 TABLET ORAL at 11:34

## 2018-09-28 RX ADMIN — LIDOCAINE 1 PATCH: 50 PATCH CUTANEOUS at 21:31

## 2018-09-28 RX ADMIN — ASPIRIN 81 MG: 81 TABLET, COATED ORAL at 08:20

## 2018-09-28 RX ADMIN — ACETAMINOPHEN 975 MG: 325 TABLET ORAL at 14:19

## 2018-09-28 NOTE — ASSESSMENT & PLAN NOTE
-non operative management per Ortho  -continue splint  -patient follow-up with orthopedics in 2 weeks  -neurovascular checks

## 2018-09-28 NOTE — SOCIAL WORK
CM met with pt to discuss the role of CM  Pt lives alone in a 1st floor apartment which has 12STE  Pt is retired, drives, and was fully independent PTA  Pt doesn't have a living will  Pt owns a cane, walker, and BSC  Pt's pharmacy is [facility] in [place]  Pt will require assistance with transportation home  Pt reports having [employer] and having one hospital stay in [place] [address]  Pt doesn't see a Psychiatrist or ICM  Pt is non-op from Ortho  Pt will need to be assessed by therapy  CM reviewed d/c planning process including the following: identifying help at home, patient preference for d/c planning needs, Discharge Lounge, [facility] Meds to Bed program, availability of treatment team to discuss questions or concerns patient and/or family may have regarding understanding medications and recognizing signs and symptoms once discharged  CM also encouraged patient to follow up with all recommended appointments after discharge  Patient advised of importance for patient and family to participate in managing patients medical well being

## 2018-09-28 NOTE — PROGRESS NOTES
[facility] 1945[facility] 67 [facility]  [facility] MRN: [id number]    [facility] Encounter: [id number]    Primary Care Provider: Armida Espinoza DO   Date and time admitted to hospital: 9/27/2018 12:37 PM        * Closed fracture of multiple ribs of right side with routine healing   Assessment & Plan    -repeat chest x-ray  -Continue incentive spirometer  -out of bed as tolerated  -saturations appropriate  -continue monitor     Left radial head fracture   Assessment & Plan    -non operative management per Ortho  -continue splint  -patient follow-up with orthopedics in 2 weeks  -neurovascular checks     Complicated laceration of lip   Assessment & Plan    -stable on exam  -local wound care     Fall   Assessment & Plan    -PT and OT eval     Hyponatremia   Assessment & Plan    -unknown if baseline  -sodium after 2 days is 128 and 128  -fluid restriction of 1200 ml  -recheck in a m   -if stable outpatient follow-up with PCP       DVT prophylaxis:  Subcutaneous heparin and SCDs  PT and OT:  Eval and treat    Disposition:  Likely DC tomorrow; follow up sodium level; fluid restriction diet; PT and OT eval; chest x-ray follow-up    Mechanism of Injury: Fall    Transfer from: not a transfer  Outside Films Received: not applicable  Tertiary Exam Due on: 9/28/18    Vitals: Blood pressure 140/78, pulse 74, temperature 98 4 °F (36 9 °C), temperature source Oral, resp  rate 18, height 5' 6" (1 676 m), weight 68 5 kg (151 lb), SpO2 98 %  ,Body mass index is 24 37 kg/m²  CT / RADIOGRAPHS: ALL RESULTS MUST BE CONFIRMED BY FACULTY OR PRINTED REPORT    Xr Elbow 3+ Views Left    Result Date: 9/27/2018  Impression: Questionable nondisplaced articular surface fracture of the radial head  Recommend appropriate clinical management and reimaging in 7-10 days  Small effusion suggested as well  The study was marked in [facility] for immediate notification   Workstation performed: [id number]     Xr Forearm 2 Views Left    Result Date: 9/27/2018  Impression: No acute abnormalities are visible on the study  Please refer to elbow report regarding possible radial head fracture  Degenerative changes of the wrist Workstation performed: [id number]     Ct Head Without Contrast    Result Date: 9/27/2018  Impression: No acute intracranial abnormality  Workstation performed: [id number]     Ct Chest Without Contrast    Result Date: 9/27/2018  Impression: Nondisplaced right anterior 4th through 6th rib fractures  Workstation performed: [id number]     Ct Spine Cervical Without Contrast    Result Date: 9/27/2018  Impression: No cervical spine fracture or traumatic malalignment    Workstation performed: [id number]     Consultants - List Service/ Faculty and Date: Ortho, PT, OT    Active medications:           Current Facility-Administered Medications:     acetaminophen (TYLENOL) tablet 975 mg, 975 mg, Oral, Q8H [facility], 975 mg at 09/28/18 0633    albuterol (PROVENTIL HFA,VENTOLIN HFA) inhaler 1 puff, 1 puff, Inhalation, Q6H PRN    aspirin (ECOTRIN LOW STRENGTH) EC tablet 81 mg, 81 mg, Oral, Daily, 81 mg at 09/28/18 0820    buPROPion (WELLBUTRIN SR) 12 hr tablet 100 mg, 100 mg, Oral, Q12H MARGARITA, 100 mg at 09/28/18 0819    carBAMazepine (TEGretol) tablet 100 mg, 100 mg, Oral, 4x Daily, 100 mg at 09/28/18 0819    doxazosin (CARDURA) tablet 4 mg, 4 mg, Oral, HS    fluticasone (FLOVENT HFA) 220 mcg/act inhaler 2 puff, 2 puff, Inhalation, Q12H [facility]    heparin (porcine) subcutaneous injection 5,000 Units, 5,000 Units, Subcutaneous, Q8H [facility] **AND** Platelet count, , , Once    losartan (COZAAR) tablet 100 mg, 100 mg, Oral, Daily, 100 mg at 09/28/18 0819 **AND** hydrochlorothiazide (HYDRODIURIL) tablet 12 5 mg, 12 5 mg, Oral, Daily, 12 5 mg at 09/28/18 0820    levothyroxine tablet 137 mcg, 137 mcg, Oral, Early Morning, 137 mcg at 09/28/18 0619    lidocaine (LIDODERM) 5 % patch 1 patch, 1 patch, Topical, Daily, 1 patch at 09/27/18 4781    lidocaine (PF) (XYLOCAINE-MPF) 1 % injection 10 mL, 10 mL, Infiltration, Once    methocarbamol (ROBAXIN) tablet 500 mg, 500 mg, Oral, Q6H PRN    oxyCODONE (ROXICODONE) IR tablet 5 mg, 5 mg, Oral, Q4H PRN, 5 mg at 09/28/18 0108    oxyCODONE (ROXICODONE) oral solution 2 5 mg, 2 5 mg, Oral, Q4H PRN    verapamil (CALAN-SR) CR tablet 180 mg, 180 mg, Oral, HS      Intake/Output Summary (Last 24 hours) at 09/28/18 1123  Last data filed at 09/28/18 0900   Gross per 24 hour   Intake              460 ml   Output                0 ml   Net              460 ml       Invasive Devices     Peripheral Intravenous Line            Peripheral IV 09/27/18 Right Forearm less than 1 day                Is the patient 72 years or older: YES:    1  Before the illness or injury that brought you to the Emergency, did you need someone to help you on a regular basis? 0=No   2  Since the illness or injury that brought you to the Emergency, have you needed more help than usual to take care of yourself? 0=No   3  Have you been hospitalized for one or more nights during the past 6 months (excluding a stay in the Emergency Department)? 0=No   4  In general, do you see well? 0=Yes   5  In general, do you have serious problems with your memory? 0=No   6  Do you take more than three different medications everyday? 0=No   TOTAL   0     Did you order a geriatric consult if the score was 2 or greater?: no    1  GCS:  GCS Total:  15  2  Head:    Facial trauma with periorbital ecchymosis bilaterally; ecchymosis over the nasal bridge; lip laceration intact  3  Neck:   WNL  4  Chest:    Rib pain on palpation; clear to auscultation bilaterally  5  Abdomen/Pelvis:   WNL  6  Back (log roll with spinal immobilization unless cleared radiographically): WNL  7  Extremities:   Lacs, abrasions, swelling, ecchymosis:  +2 pulses on extremities, neurovascularly intact   Tenderness, pain with motor, instability:  Nontender extremities  8  Peripheral Nerves:   WNL    Do NOT use the following abbreviations: DTO, gr, Kelsey, MS, MSO4, MgSO4, Nitro, QD, QID, QOD, u, , ?, ?g or trailing zeros  Always use a zero before a decimal     Labs:   CBC:   Lab Results   Component Value Date    WBC 6 13 09/28/2018    HGB 12 4 09/28/2018    HCT 37 3 09/28/2018    MCV 92 09/28/2018     09/28/2018    MCH 30 7 09/28/2018    MCHC 33 2 09/28/2018    RDW 12 1 09/28/2018    MPV 8 1 (L) 09/28/2018    NRBC 0 09/28/2018     CMP:   Lab Results   Component Value Date     (L) 09/28/2018    CL 96 (L) 09/28/2018    CO2 25 09/28/2018    BUN 10 09/28/2018    CREATININE 0 79 09/28/2018    CALCIUM 8 5 09/28/2018    EGFR 75 09/28/2018       Nurse provider rounds completed, plan of care discussed  Patient stated that she would update family

## 2018-09-28 NOTE — PLAN OF CARE
Problem: PHYSICAL THERAPY ADULT  Goal: Performs mobility at highest level of function for planned discharge setting  See evaluation for individualized goals  Treatment/Interventions: Functional transfer training, LE strengthening/ROM, Therapeutic exercise, Endurance training, Patient/family training, Equipment eval/education, Compensatory technique education, Gait training, Bed mobility, Elevations, Spoke to nursing, Spoke to case management          See flowsheet documentation for full assessment, interventions and recommendations  Prognosis: Good  Problem List: Decreased endurance, Impaired balance, Decreased mobility, Decreased skin integrity, Orthopedic restrictions, Pain  Assessment: Pt is a 67year old female presenting s/p fall down flight of concrete steps in which she sustained a lip laceration, closed nondisplaced fracture of head of L radius and closed fracture of ,ultiple ribs on R side  Additional problem list includes arthritis, asthma, bipolar disorder, depression, HTN, hernia, and uterine leiomyoma  PT consulted to assess functional mobility and assist with safe d/c planning  PT eval preformed with NWB LUE and ambulation orders  PTA, pt living at home alone in a 1 floor apartment with [address]  Pt admits to 2 falls in the last 6 months and denies any use of DME  On eval, pt presenting with the following deficits: impaired balance, poor tolerance to activity, pain, and decreased overall functional mobility  Pt able to perform bed mobility, transfers and gait at CGA/CS  Pt limited by pain  Dc planing guarded at this time as patient with little to no support at d/c  Will follow up to progress functional mobility (I) and safety  Barriers to Discharge: Decreased caregiver support, Inaccessible home environment  Barriers to Discharge Comments: [address], lives alone with little support   Recommendation: Other (Comment) (pending progress)          See flowsheet documentation for full assessment

## 2018-09-28 NOTE — CASE MANAGEMENT
Initial Clinical Review    Admission: Date/Time/Statement: Observation 9/27 #@ 1541    Orders Placed This Encounter   Procedures    Place in Observation     Standing Status:   Standing     Number of Occurrences:   1     Order Specific Question:   Admitting Physician     Answer:   Jackeline Arce [[id number]]     Order Specific Question:   Level of Care     Answer:   Level 2 Stepdown / HOT [14]     Order Specific Question:   Bed Type     Answer:   Trauma [7]       ED: Date/Time/Mode of Arrival:   ED Arrival Information     Expected Arrival Acuity Means of Arrival Escorted By Service Admission Type    - 9/27/2018 12:29 Emergent Walk-In Self Trauma Emergency    Arrival Complaint    fell          Chief Complaint:   Chief Complaint   Patient presents with    Fall     pt fell down a flight of cement steps  denies CP, SOB, dizziness  pt has laceration to inside of lower lip, abrasions to bilateral arms  History of Illness:  67 y o  female who presents following a mechanical fall down a flight of concrete steps  She denies any LOC  She is GCS 15   CT chest showing right rib fractures  FAST was done and negative     Currently she is complaining of right rib and left forearm pain  ED Vital Signs:   ED Triage Vitals [09/27/18 1236]   Temperature Pulse Respirations Blood Pressure SpO2   (!) 96 8 °F (36 °C) 86 20 113/58 95 %      Temp Source Heart Rate Source Patient Position - Orthostatic VS BP Location FiO2 (%)   Tympanic Monitor Sitting Left arm --      Pain Score       Worst Possible Pain        Wt Readings from Last 1 Encounters:   09/27/18 68 5 kg (151 lb)       Vital Signs (abnormal): WNL    Abnormal Labs: Na = 128    Diagnostic Test Results: CT Chest - Nondisplaced right anterior 4th through 6th rib fractures  Xray Left Elbow - Questionable nondisplaced articular surface fracture of the radial head  Recommend appropriate clinical management and reimaging in 7-10 days    Small effusion suggested as well        ED Treatment:   Medication Administration from 09/27/2018 1229 to 09/27/2018 2123       Date/Time Order Dose Route Action     09/27/2018 1330 acetaminophen (TYLENOL) tablet 975 mg 975 mg Oral Given     09/27/2018 1343 morphine (PF) 4 mg/mL injection 4 mg 4 mg Intravenous Given     09/27/2018 1441 tetanus-diphtheria-acellular pertussis (BOOSTRIX) IM injection 0 5 mL 0 5 mL Intramuscular Given     09/27/2018 1442 morphine (PF) 4 mg/mL injection 4 mg 4 mg Intravenous Given     09/27/2018 1537 lidocaine (PF) (XYLOCAINE-MPF) 1 % injection **AcuDose Override Pull**    Given by Other          Past Medical/Surgical History: Active Ambulatory Problems     Diagnosis Date Noted    No Active Ambulatory Problems     Resolved Ambulatory Problems     Diagnosis Date Noted    No Resolved Ambulatory Problems     Past Medical History:   Diagnosis Date    Arthritis     Asthma     Bipolar disorder ([facility] [address] )     Cyst of left ovary     Depression     Diverticulitis of colon     Hypertension     Hypothyroidism     Rheumatic fever     Umbilical hernia     Uterine leiomyoma        Admitting Diagnosis: Lip laceration, initial encounter [S01 511A]  Injury, unspecified, initial encounter [T14 90XA]  Closed nondisplaced fracture of head of left radius, initial encounter [S52 125A]  Unspecified multiple injuries, initial encounter [T07  XXXA]    Age/Sex: 67 y o  female    Assessment/Plan:  79y Female to ED with S/P Fall down concrete steps/ Right Rib Fractures 4-6/ Possible Left Radial Head Fracture  Rib fracture protocol  Incentive Spirometry  Pain control  Orthopedic consult  9/27 Orthopedic cons:  NWТАТЬЯНА LUE in a posterior slab splint and sling  No operative intervention   Pain control    Admission Orders:  S/P Laceration Repair  Continuous pulse oximetry  Orthopedic Surgery cons  PT/OT eval and treat    Scheduled Meds:   Current Facility-Administered Medications:  acetaminophen 975 mg Oral Q8H MARGARITA   aspirin 81 mg Oral Daily   buPROPion 100 mg Oral Q12H MARGARITA   carBAMazepine 100 mg Oral 4x Daily   doxazosin 4 mg Oral HS   fluticasone 2 puff Inhalation Q12H [address]   heparin (porcine) 5,000 Units Subcutaneous Q8H [address]   losartan 100 mg Oral Daily   And      hydrochlorothiazide 12 5 mg Oral Daily   levothyroxine 137 mcg Oral Early Morning   lidocaine 1 patch Topical Daily   lidocaine (PF) 10 mL Infiltration Once   verapamil 180 mg Oral HS     Continuous Infusions:    PRN Meds: albuterol    methocarbamol    oxyCODONE po x1

## 2018-09-28 NOTE — RESPIRATORY THERAPY NOTE
resp care      09/28/18 1034   Respiratory Assessment   Assessment Type Pre-treatment   General Appearance Alert; Awake   Respiratory Pattern Normal   Chest Assessment Chest expansion symmetrical   Bilateral Breath Sounds Diminished;Clear   Resp Comments pt states she is using IS on her own  Pt has clear bs  Pt encouraged to cont IS  Will d/c pt from [facility]

## 2018-09-28 NOTE — DISCHARGE INSTRUCTIONS
Discharge Instructions - Orthopedics  Jewels Ratliff 67 y o  female MRN: [id number]  Unit/Bed#: [facility] 908-01    Weight Bearing Status:                                           Non weight bearing left upper extremity    Pain:  Continue analgesics as directed    Dressing Instructions:   Keep splint clean, dry and intact until follow up appointment  Will remove splint and begin ROM in clinic  PT/OT:  No PT/OT until clinic    Appt Instructions: If you do not have your appointment, please call the clinic at [phone] to f/u with Dr Corine Ibrahim in 2 weeks      Contact the office sooner if you experience any increased numbness/tingling in the extremities  Miscellaneous:  None    Rib Fracture Discharge Instructions: Your rib fractures will take time to heal  Do not do any strenuous activity or lift any thing more than 10 pounds until you are cleared to do so by the Trauma clinic  Take your pain medication, if needed, as prescribed and make sure you continue to perform cough and deep breathing exercises and use your incentive spirometer every two hours while awake to maintain healthy lungs post injury  You should be seen in the Trauma Clinic 2-3 weeks after being discharged  Please call the Trauma Clinic sooner if you have any questions or concerns or if you experience increased work of breathing, shortness of breath, difficulty breathing, activity intolerance or chest pain  Traumatic Laceration and Wound Care Instructions:     Wound Care:  - Wash laceration/wound daily, gently in the shower, do not scrub  Pat dry with clean towel  Do NOT immerse completely in water (i e  tub or swimming pool)  - sutures are dissolvable and do not require removal  - Call office if you develop fever/chills, redness/swelling/drainage from the site      Additional Instructions:  - If you have any questions or concerns after discharge please call the office   - Call office or return to ER if fever greater than 101, chills, increasing redness/swelling at site of laceration/wound, purulent or foul smelling drainage from laceration/wound, and/or worsening/uncontrollable pain  Hyponatremia   WHAT YOU NEED TO KNOW:   Hyponatremia occurs when the amount of sodium (salt) in your blood is lower than normal  Sodium is an electrolyte (mineral) that helps your muscles, heart, and digestive system work properly  It helps control blood pressure and fluid balance  DISCHARGE INSTRUCTIONS:   Follow up with your healthcare provider as directed: You may need to return for more tests  Write down your questions so you remember to ask them during your visits  Nutrition:  You may need to increase your intake of sodium  Foods that are high in sodium include milk, packaged snacks such as pretzels, or processed meats (moss, sausage, and ham)  Ask your dietitian to help you create a meal plan that is right for you  Liquids: Follow your healthcare provider's advice if you need to limit the amount of liquid you drink  Ask how much liquid to drink each day and which liquids are best for you  You may be asked to drink liquids that have water, sugar, and salt, such as juices, milk, or sports drinks  These liquids help your body hold in fluid and prevent dehydration  Contact your healthcare provider if:   · You have muscle cramps or twitching  · You feel very weak or tired  · You have nausea or are vomiting  · You have questions or concerns about your condition or care  Return to the emergency department if:   · You have a seizure  · You have an irregular heartbeat  · You have trouble breathing  · You cannot move your arms and legs  · You are confused or cannot think clearly  © 2017 [facility] Information is for End User's use only and may not be sold, redistributed or otherwise used for commercial purposes   All illustrations and images included in CareNotes® are the copyrighted property of [employer]  or Fransico Lee  The above information is an  only  It is not intended as medical advice for individual conditions or treatments  Talk to your doctor, nurse or pharmacist before following any medical regimen to see if it is safe and effective for you

## 2018-09-28 NOTE — ASSESSMENT & PLAN NOTE
-unknown if baseline  -sodium after 2 days is 128 and 128  -fluid restriction of 1200 ml  -recheck in a m   -if stable outpatient follow-up with PCP

## 2018-09-28 NOTE — ASSESSMENT & PLAN NOTE
-repeat chest x-ray  -Continue incentive spirometer  -out of bed as tolerated  -saturations appropriate  -continue monitor

## 2018-09-28 NOTE — PROGRESS NOTES
Subjective:  Patient seen examined this morning, no acute events overnight  Patient complains of some soreness left elbow, but otherwise no numbness or tingling of the extremity  Sling is in the room  Objective:  Lab Results   Component Value Date/Time    WBC 6 93 09/27/2018 01:43 PM    HGB 12 7 09/27/2018 01:43 PM       Vitals:    09/28/18 0401   BP: 144/69   Pulse: 66   Resp: 18   Temp: 98 °F (36 7 °C)   SpO2: 98%     Left upper extremity  Splint c/d/i  Motor & sensation grossly intact  Limb warm and well perfused    Assessment:  70-year-old female hospital day 1 after a fall where she sustained a nondisplaced radial head fracture    Plan:   Nonweightbearing left upper extremity in splint and sling when out of bed  Begin range-of-motion therapy 1 week  Follow-up in the Orthopedic Clinic with Dr Capri Bonilla in 2 weeks  Pain control  DVT ppx  PT  Dispo:  Orthopedically stable for discharge

## 2018-09-28 NOTE — PHYSICAL THERAPY NOTE
Physical Therapy Evaluation     Patient's Name: Dorothy Zamora    Admitting Diagnosis  Lip laceration, initial encounter [U56 241Y]  Injury, unspecified, initial encounter [T14 90XA]  Closed nondisplaced fracture of head of left radius, initial encounter [S52 125A]  Unspecified multiple injuries, initial encounter [T07  XXXA]    Problem List  Patient Active Problem List   Diagnosis    Closed fracture of multiple ribs of right side with routine healing    Complicated laceration of lip    Left radial head fracture    Fall    Hyponatremia       Past Medical History  Past Medical History:   Diagnosis Date    Arthritis     Asthma     Bipolar disorder ([address] )     Cyst of left ovary     Depression     Diverticulitis of colon     Hypertension     Hypothyroidism     Rheumatic fever     history of    Umbilical hernia     Uterine leiomyoma        Past Surgical History  Past Surgical History:   Procedure Laterality Date    ACHILLES TENDON SURGERY      BACK SURGERY      BILATERAL KNEE ARTHROSCOPY      COLONOSCOPY N/A 9/7/2016    Procedure: COLONOSCOPY;  Surgeon: Rena Ha MD;  [facility]: [facility]; Service:     ENDOMETRIAL BIOPSY      BY SUCTION    GANGLION CYST EXCISION      WRIST    HERNIA REPAIR      UMBILICAL HERNIA HERNIORRHAPHY    MO ERCP W/SPHINCTEROTOMY/PAPILLOTOMY N/A 7/2/2018    Procedure: ENDOSCOPIC RETROGRADE CHOLANGIOPANCREATOGRAPHY (ERCP); Surgeon: Ella Gillette MD;  Location: [facility];   Service: Gastroenterology    REPLACEMENT TOTAL KNEE      SPINE SURGERY      TONSILLECTOMY AND ADENOIDECTOMY          09/28/18 1636   Note Type   Note type Eval/Treat   Pain Assessment   Pain Assessment 0-10   Pain Score 5   Pain Type Acute pain   Pain Location Generalized   Home Living   Type of Home Apartment   Home Layout One level  ([address] )   Bathroom Shower/Tub Tub/shower unit   Bathroom Toilet Standard   Bathroom Accessibility Accessible   Additional Comments Pt denies any use of DME PTA   Prior Function   Level of [place] Independent with ADLs and functional mobility   Lives With Alone   Receives Help From Neighbor   ADL Assistance Independent   IADLs Independent   Falls in the last 6 months 1 to 4  (2)   Vocational Retired   Comments Pt is retired and drives  She lives alone and has limited support   Restrictions/Precautions   Weight Bearing Precautions Per Order Yes   LUE Weight Bearing Per Order NWB   Braces or Orthoses Sling   Other Precautions WBS; Fall Risk;Pain   General   Family/Caregiver Present No   Cognition   Overall Cognitive Status WFL   Arousal/Participation Cooperative   Orientation Level Oriented X4   Memory Within functional limits   Following Commands Follows all commands and directions without difficulty   Comments Pt is pleasant and cooperative  Able to converse beyond basic needs  Educated on WBS in which she was able to recall and demosntrate    RUE Assessment   RUE Assessment WFL   LUE Assessment   LUE Assessment (NWB LUE in sling )   RLE Assessment   RLE Assessment WFL   LLE Assessment   LLE Assessment WFL   Coordination   Movements are Fluid and Coordinated 0   Coordination and Movement Description bradykinetic    Sensation WFL   Light Touch   RLE Light Touch Grossly intact   LLE Light Touch Grossly intact   Proprioception   RLE Proprioception Grossly intact   LLE Proprioception Grossly Intact   Bed Mobility   Rolling L 5  Supervision   Supine to Sit 4  Minimal assistance   Additional items Assist x 1   Additional Comments Pt denied dizziness/light headedness    Transfers   Sit to Stand 4  Minimal assistance   Additional items Assist x 1   Stand to Sit 4  Minimal assistance   Additional items Assist x 1   Additional Comments Mobility performed at [facility]    Ambulation/Elevation   Gait pattern Short stride; Shuffling   Gait Assistance 4  Minimal assist   Additional items Assist x 1   Assistive Device None   Distance 80'   Balance   Static Sitting Good   Dynamic Sitting Fair   Static Standing Fair   Dynamic Standing Fair -   Ambulatory Fair -   Endurance Deficit   Endurance Deficit Yes   Endurance Deficit Description pain    Activity Tolerance   Activity Tolerance Patient limited by fatigue;Patient limited by pain   Medical Staff Made Aware TARAS Rosario; Aimee Bueno OT   Nurse Made Aware LON Rodriguez    Assessment   Prognosis Good   Problem List Decreased endurance; Impaired balance;Decreased mobility; Decreased skin integrity;Orthopedic restrictions;Pain   Assessment Pt is a 67year old female presenting s/p fall down flight of concrete steps in which she sustained a lip laceration, closed nondisplaced fracture of head of L radius and closed fracture of ,ultiple ribs on R side  Additional problem list includes arthritis, asthma, bipolar disorder, depression, HTN, hernia, and uterine leiomyoma  PT consulted to assess functional mobility and assist with safe d/c planning  PT eval preformed with NWB LUE and ambulation orders  PTA, pt living at home alone in a 1 floor apartment with [address]  Pt admits to 2 falls in the last 6 months and denies any use of DME  On eval, pt presenting with the following deficits: impaired balance, poor tolerance to activity, pain, and decreased overall functional mobility  Pt able to perform bed mobility, transfers and gait at CGA/CS  Pt limited by pain  Dc planing guarded at this time as patient with little to no support at d/c  Will follow up to progress functional mobility (I) and safety  Barriers to Discharge Decreased caregiver support; Inaccessible home environment   Barriers to Discharge Comments [address], lives alone with little support    Goals   Patient Goals patient would like to have her son speak with her    STG Expiration Date 10/08/18   Short Term Goal #1 Pt will be mod(I) with rolling R and L for ease with OOB transfer  Pt will be mod(I) with sit<->stand to promote (I) with toileting  Pt will be mod(I) with supine<->sit transfer   Pt will be mod(I) with ambualtion of household distances (appx 50') to reduce caregiver burden  Pt will be S with community distance ambulaiton using least restrictive AD to increase tolerance to activity  Pt will be S ascending and descending 12 steps to gain access into home  Pt will particiapte in HEP consisitng of balance, strength, ROM and coordinaiton tasks to increase activitiy, improve (I) and decrease pain  Pt will recall and demonstrate understanding of % without verbal instruction    Treatment Day 0   Plan   Treatment/Interventions Functional transfer training;LE strengthening/ROM; Therapeutic exercise; Endurance training;Patient/family training;Equipment eval/education; Compensatory technique education;Gait training;Bed mobility;Elevations; Spoke to nursing;Spoke to case management   PT Frequency Other (Comment)  (3-6x/wk)   Recommendation   Recommendation Other (Comment)  (pending progress)   Additional Comments OOB in chair with all needs within reach    Barthel Index   Feeding 5   Bathing 0   Grooming Score 0   Dressing Score 5   Bladder Score 10   Bowels Score 10   Toilet Use Score 5   Transfers (Bed/Chair) Score 10   Mobility (Level Surface) Score 0   Stairs Score 0   Barthel Index Score 45         Elke Lockhart, PT

## 2018-09-29 PROBLEM — K59.00 CONSTIPATION: Status: ACTIVE | Noted: 2018-09-29

## 2018-09-29 PROBLEM — I95.1 ORTHOSTATIC HYPOTENSION: Status: ACTIVE | Noted: 2018-09-29

## 2018-09-29 LAB
ANION GAP SERPL CALCULATED.3IONS-SCNC: 7 MMOL/L (ref 4–13)
BUN SERPL-MCNC: 9 MG/DL (ref 5–25)
CALCIUM SERPL-MCNC: 8.4 MG/DL (ref 8.3–10.1)
CHLORIDE SERPL-SCNC: 96 MMOL/L (ref 100–108)
CO2 SERPL-SCNC: 27 MMOL/L (ref 21–32)
CREAT SERPL-MCNC: 0.54 MG/DL (ref 0.6–1.3)
GFR SERPL CREATININE-BSD FRML MDRD: 95 ML/MIN/1.73SQ M
GLUCOSE SERPL-MCNC: 115 MG/DL (ref 65–140)
POTASSIUM SERPL-SCNC: 3.8 MMOL/L (ref 3.5–5.3)
SODIUM SERPL-SCNC: 130 MMOL/L (ref 136–145)

## 2018-09-29 PROCEDURE — G8988 SELF CARE GOAL STATUS: HCPCS

## 2018-09-29 PROCEDURE — G8987 SELF CARE CURRENT STATUS: HCPCS

## 2018-09-29 PROCEDURE — 97530 THERAPEUTIC ACTIVITIES: CPT

## 2018-09-29 PROCEDURE — 97167 OT EVAL HIGH COMPLEX 60 MIN: CPT

## 2018-09-29 PROCEDURE — 80048 BASIC METABOLIC PNL TOTAL CA: CPT | Performed by: PHYSICIAN ASSISTANT

## 2018-09-29 PROCEDURE — 99225 PR SBSQ OBSERVATION CARE/DAY 25 MINUTES: CPT | Performed by: PHYSICIAN ASSISTANT

## 2018-09-29 RX ORDER — DOCUSATE SODIUM 100 MG/1
100 CAPSULE, LIQUID FILLED ORAL 2 TIMES DAILY
Status: DISCONTINUED | OUTPATIENT
Start: 2018-09-29 | End: 2018-10-02 | Stop reason: HOSPADM

## 2018-09-29 RX ORDER — AMOXICILLIN 250 MG
1 CAPSULE ORAL
Status: DISCONTINUED | OUTPATIENT
Start: 2018-09-29 | End: 2018-09-29

## 2018-09-29 RX ORDER — POLYETHYLENE GLYCOL 3350 17 G/17G
17 POWDER, FOR SOLUTION ORAL DAILY PRN
Status: DISCONTINUED | OUTPATIENT
Start: 2018-09-29 | End: 2018-10-02 | Stop reason: HOSPADM

## 2018-09-29 RX ADMIN — OXYCODONE HYDROCHLORIDE 2.5 MG: 5 SOLUTION ORAL at 01:45

## 2018-09-29 RX ADMIN — ACETAMINOPHEN 975 MG: 325 TABLET ORAL at 21:16

## 2018-09-29 RX ADMIN — HEPARIN SODIUM 5000 UNITS: 5000 INJECTION, SOLUTION INTRAVENOUS; SUBCUTANEOUS at 05:18

## 2018-09-29 RX ADMIN — DOCUSATE SODIUM 100 MG: 100 CAPSULE, LIQUID FILLED ORAL at 17:15

## 2018-09-29 RX ADMIN — CARBAMAZEPINE 100 MG: 200 TABLET ORAL at 17:15

## 2018-09-29 RX ADMIN — LOSARTAN POTASSIUM 100 MG: 50 TABLET ORAL at 08:15

## 2018-09-29 RX ADMIN — LIDOCAINE 1 PATCH: 50 PATCH CUTANEOUS at 22:30

## 2018-09-29 RX ADMIN — BUPROPION HYDROCHLORIDE 100 MG: 100 TABLET, FILM COATED, EXTENDED RELEASE ORAL at 21:18

## 2018-09-29 RX ADMIN — ACETAMINOPHEN 975 MG: 325 TABLET ORAL at 05:18

## 2018-09-29 RX ADMIN — HYDROCHLOROTHIAZIDE 12.5 MG: 12.5 TABLET ORAL at 08:15

## 2018-09-29 RX ADMIN — BUPROPION HYDROCHLORIDE 100 MG: 100 TABLET, FILM COATED, EXTENDED RELEASE ORAL at 08:15

## 2018-09-29 RX ADMIN — METHOCARBAMOL 500 MG: 500 TABLET ORAL at 01:45

## 2018-09-29 RX ADMIN — LEVOTHYROXINE SODIUM 137 MCG: 112 TABLET ORAL at 05:15

## 2018-09-29 RX ADMIN — DOXAZOSIN 4 MG: 4 TABLET ORAL at 21:15

## 2018-09-29 RX ADMIN — CARBAMAZEPINE 100 MG: 200 TABLET ORAL at 08:16

## 2018-09-29 RX ADMIN — HEPARIN SODIUM 5000 UNITS: 5000 INJECTION, SOLUTION INTRAVENOUS; SUBCUTANEOUS at 13:48

## 2018-09-29 RX ADMIN — CARBAMAZEPINE 100 MG: 200 TABLET ORAL at 12:12

## 2018-09-29 RX ADMIN — ACETAMINOPHEN 975 MG: 325 TABLET ORAL at 13:48

## 2018-09-29 RX ADMIN — VERAPAMIL HYDROCHLORIDE 180 MG: 180 TABLET, FILM COATED, EXTENDED RELEASE ORAL at 21:21

## 2018-09-29 RX ADMIN — OXYCODONE HYDROCHLORIDE 5 MG: 5 TABLET ORAL at 08:15

## 2018-09-29 RX ADMIN — ASPIRIN 81 MG: 81 TABLET, COATED ORAL at 08:15

## 2018-09-29 RX ADMIN — SODIUM CHLORIDE 1000 ML: 0.9 INJECTION, SOLUTION INTRAVENOUS at 10:40

## 2018-09-29 RX ADMIN — HEPARIN SODIUM 5000 UNITS: 5000 INJECTION, SOLUTION INTRAVENOUS; SUBCUTANEOUS at 21:23

## 2018-09-29 RX ADMIN — CARBAMAZEPINE 100 MG: 200 TABLET ORAL at 21:14

## 2018-09-29 NOTE — ASSESSMENT & PLAN NOTE
- Appreciate orthopedic surgery evaluation and recommendations   - Non-operative management per Ortho  - Maintain left upper extremity splint splint and nonweightbearing status on the left upper extremity   - Stable for discharge from Orthopedic standpoint   - Outpatient follow-up with Orthopedics in 2 weeks

## 2018-09-29 NOTE — ASSESSMENT & PLAN NOTE
- Status post mechanical fall down multiple concrete steps  - Fall precautions  - Continue PT and OT evaluation and treatment as indicated

## 2018-09-29 NOTE — CASE MANAGEMENT
CLINICAL FOR EXTENDED OBSERVATION ---       9/29 --  /64 (BP Location: Right arm)   Pulse 69   Temp 98 1 °F (36 7 °C) (Oral)   Resp 17   Ht 5' 6" (1 676 m)   Wt 68 5 kg (151 lb)   SpO2 98%   BMI 24 37 kg/m²       Ref Range & Units 09/29/18 0437   Sodium 136 - 145 mmol/L 130     Potassium 3 5 - 5 3 mmol/L 3 8    Chloride 100 - 108 mmol/L 96     CO2 21 - 32 mmol/L 27    ANION GAP 4 - 13 mmol/L 7    BUN 5 - 25 mg/dL 9    Creatinine 0 60 - 1 30 mg/dL 0 54         Assessment/plan:      Fall   Assessment & Plan     - Status post mechanical fall down multiple concrete steps  - Fall precautions  - Continue PT and OT evaluation and treatment as indicated       * Closed fracture of multiple ribs of right side with routine healing   Assessment & Plan     - Multiple right-sided rib fractures (# 4-6)  - Continue rib fracture protocol   - Continue to encourage aggressive incentive spirometer use and adequate pulmonary hygiene  - Continue activity in being out of bed frequently  - Monitor oxygen saturation and provide supplemental O2 as needed to maintain O2 saturation greater than or equal to 94%  - Continue multimodal analgesic regimen  - Chest x-ray findings from 09/28/2018 reviewed, but still awaiting final radiology interpretation       Left radial head fracture   Assessment & Plan     - Appreciate orthopedic surgery evaluation and recommendations   - Non-operative management per Ortho  - Maintain left upper extremity splint splint and nonweightbearing status on the left upper extremity   - Stable for discharge from Orthopedic standpoint   - Outpatient follow-up with Orthopedics in 2 weeks       Complicated laceration of lip   Assessment & Plan     - Lip laceration status post suture repair   - Continue local wound care  - Sutures will dissolve       Hyponatremia   Assessment & Plan     - Hyponatremia with unknown baseline   - Sodium level improved from 128-130 on 09/29/2018  - Patient asymptomatic    - Continue oral diet with fluid restriction of 1200 ml per day  - Will need outpatient follow-up with PCP       Orthostatic hypotension   Assessment & Plan     - Patient has been having presyncopal symptoms with positional changes and is noted to have positive orthostatics hypertension during her therapy evaluation on 09/29/2018  - Will provide additional intravascular volume with 1 L fluid bolus on 09/29/2018  - Patient cautioned to take her time when getting up and spend additional time sitting on the side of bed prior to standing as well as to call for help and get up only with assistance  - No evidence of anemia with stable hemoglobin the last 2 days  Repeat CBC tomorrow       Constipation   Assessment & Plan     - Mild constipation without abdominal discomfort or distention   - Initiate a bowel regimen today with Senokot  - MiraLax as needed             Disposition:  Patient will need discharge to inpatient rehab when continued improvement in her hyponatremia and improvement in her orthostatic hypotension  PT eval --   Assessment Pt supine in bed upon arrival, pt is fearful and nervous about having to go home and take care of herself  Pt agreeable to OOB BP supine 122/60 Min assist to sit EOB once seated EOB /58  Pt stood min assit and upon standing pt reports dizziness and lightheadedness and needing to sit  Pt seated in relciner and BP taken multiple times while seated 110/60,94/55,85/55,99/54 Pt reports not feeling well and BP dropping while seated so pt returned to supine in bed where BP slowly returned to normal  LON Henderson and [facility] where both notified of pt orthostatic hypo  Pt will continue to bebenfit from skilled Pt to further increased her stregth and improve balance in order to maximize safe fucntional mobility        Recommendation   Recommendation (Rehab)

## 2018-09-29 NOTE — ASSESSMENT & PLAN NOTE
- Multiple right-sided rib fractures (# 4-6)  - Continue rib fracture protocol   - Continue to encourage aggressive incentive spirometer use and adequate pulmonary hygiene  - Continue activity in being out of bed frequently  - Monitor oxygen saturation and provide supplemental O2 as needed to maintain O2 saturation greater than or equal to 94%  - Continue multimodal analgesic regimen  - Chest x-ray findings from 09/28/2018 reviewed, but still awaiting final radiology interpretation

## 2018-09-29 NOTE — ASSESSMENT & PLAN NOTE
- Patient has been having presyncopal symptoms with positional changes and is noted to have positive orthostatics hypertension during her therapy evaluation on 09/29/2018  - Will provide additional intravascular volume with 1 L fluid bolus on 09/29/2018  - Patient cautioned to take her time when getting up and spend additional time sitting on the side of bed prior to standing as well as to call for help and get up only with assistance  - No evidence of anemia with stable hemoglobin the last 2 days  Repeat CBC tomorrow

## 2018-09-29 NOTE — PROGRESS NOTES
Rounding note with UMAIR Lee-spoke to patient re: episode of lightheadedness, nausea this past night  Will check orthostatic BP's  Patient also states about not having BM yet, PA will adjust medications for same

## 2018-09-29 NOTE — ASSESSMENT & PLAN NOTE
- Mild constipation without abdominal discomfort or distention   - Initiate a bowel regimen today with Senokot  - MiraLax as needed

## 2018-09-29 NOTE — PROGRESS NOTES
Progress Note - Samia Colno 1945, 67 y o  female MRN: [id number]    Unit/Bed#: [facility] 908-01 Encounter: [id number]    Primary Care Provider: Valerie Gonzales, DO   Date and time admitted to hospital: 9/27/2018 12:37 PM        Fall   Assessment & Plan    - Status post mechanical fall down multiple concrete steps  - Fall precautions  - Continue PT and OT evaluation and treatment as indicated  * Closed fracture of multiple ribs of right side with routine healing   Assessment & Plan    - Multiple right-sided rib fractures (# 4-6)  - Continue rib fracture protocol   - Continue to encourage aggressive incentive spirometer use and adequate pulmonary hygiene  - Continue activity in being out of bed frequently  - Monitor oxygen saturation and provide supplemental O2 as needed to maintain O2 saturation greater than or equal to 94%  - Continue multimodal analgesic regimen  - Chest x-ray findings from 09/28/2018 reviewed, but still awaiting final radiology interpretation  Left radial head fracture   Assessment & Plan    - Appreciate orthopedic surgery evaluation and recommendations   - Non-operative management per Ortho  - Maintain left upper extremity splint splint and nonweightbearing status on the left upper extremity   - Stable for discharge from Orthopedic standpoint   - Outpatient follow-up with Orthopedics in 2 weeks  Complicated laceration of lip   Assessment & Plan    - Lip laceration status post suture repair   - Continue local wound care  - Sutures will dissolve  Hyponatremia   Assessment & Plan    - Hyponatremia with unknown baseline   - Sodium level improved from 128-130 on 09/29/2018  - Patient asymptomatic   - Continue oral diet with fluid restriction of 1200 ml per day  - Will need outpatient follow-up with PCP       Orthostatic hypotension   Assessment & Plan    - Patient has been having presyncopal symptoms with positional changes and is noted to have positive orthostatics hypertension during her therapy evaluation on 09/29/2018  - Will provide additional intravascular volume with 1 L fluid bolus on 09/29/2018  - Patient cautioned to take her time when getting up and spend additional time sitting on the side of bed prior to standing as well as to call for help and get up only with assistance  - No evidence of anemia with stable hemoglobin the last 2 days  Repeat CBC tomorrow  Constipation   Assessment & Plan    - Mild constipation without abdominal discomfort or distention   - Initiate a bowel regimen today with Senokot  - MiraLax as needed  Nurse-patient-provider rounds completed today with the patient's nurse, Brooklynn Peoples  Disposition:  Patient will need discharge to inpatient rehab when medically appropriate  Anticipate being appropriate for discharge in the next 24-48 hours ending continued improvement in her hyponatremia and improvement in her orthostatic hypotension  Subjective:  Patient states "I am not feeling too well today "  She had been feeling pretty well yesterday, but overnight she had poorly controlled chest wall pain  Earlier this morning, a nurse helped her to the bathroom  When she was done going to the bathroom, she attempted to get up before feeling lightheaded and dizzy and fell as though she was going to pass out  She was helped back to bed by the nursing staff and has felt better since returning to bed  The therapy staff also noted that she began to have some dizziness and lightheadedness when working with them this morning and she was found to have orthostatic hypotension on the evaluation  Objective:    Meds/Allergies   Prescriptions Prior to Admission   Medication Sig Dispense Refill Last Dose    ALBUTEROL SULFATE HFA IN Inhale as needed   9/27/2018 at Unknown time    aspirin (ECOTRIN LOW STRENGTH) 81 mg EC tablet Take 81 mg by mouth daily     Past Month at Unknown time    BUPROPION HCL ER, SR, PO Take 300 mg by mouth daily     9/27/2018 at Unknown time    Calcium Carbonate-Vitamin D (CALTRATE 600+D PO) Take 1 tablet by mouth daily  9/27/2018 at Unknown time    carBAMazepine (TEGretol) 200 mg tablet Take 100 mg by mouth 4 (four) times a day  9/27/2018 at Unknown time    Cholecalciferol (VITAMIN D3) 1000 UNITS CAPS Take 1 capsule by mouth daily  9/27/2018 at Unknown time    doxazosin (CARDURA) 2 mg tablet Take 2 mg by mouth daily at bedtime     9/27/2018 at Unknown time    fluticasone (FLOVENT HFA) 220 mcg/act inhaler Inhale 2 puffs 2 (two) times a day as needed Rinse mouth after use    9/27/2018 at Unknown time    Levothyroxine Sodium 137 MCG CAPS Take 1 tablet by mouth Indications: 1T 5x/week  9/27/2018 at Unknown time    Omega-3 Fatty Acids (OMEGA-3 FISH OIL PO) Take 1 capsule by mouth daily Indications: 1000-300mg  Past Month at Unknown time    valsartan-hydrochlorothiazide (DIOVAN-HCT) 320-12 5 MG per tablet Take 1 tablet by mouth daily  9/27/2018 at Unknown time    verapamil (CALAN-SR) 180 mg CR tablet Take 180 mg by mouth daily at bedtime     9/27/2018 at Unknown time       Current Facility-Administered Medications:     acetaminophen (TYLENOL) tablet 975 mg, 975 mg, Oral, Q8H [address], Dario Sheridan MD, 975 mg at 09/29/18 0518    albuterol (PROVENTIL HFA,VENTOLIN HFA) inhaler 1 puff, 1 puff, Inhalation, Q6H PRN, Dario Sheridan MD    aspirin (ECOTRIN LOW STRENGTH) EC tablet 81 mg, 81 mg, Oral, Daily, Dario Sheridan MD, 81 mg at 09/29/18 0815    buPROPion [facility] SR) 12 hr tablet 100 mg, 100 mg, Oral, Q12H [address], Dario Sheridan MD, 100 mg at 09/29/18 0815    carBAMazepine (TEGretol) tablet 100 mg, 100 mg, Oral, 4x Daily, Dario Sheridan MD, 100 mg at 09/29/18 0816    doxazosin (CARDURA) tablet 4 mg, 4 mg, Oral, HS, [place] Arvin, MD, 4 mg at 09/28/18 2136    fluticasone (FLOVENT HFA) 220 mcg/act inhaler 2 puff, 2 puff, Inhalation, Q12H [address], Dario Sheridan MD    heparin (porcine) subcutaneous injection 5,000 Units, 5,000 Units, Subcutaneous, Q8H [facility], 5,000 Units at 18 0518 **AND** Platelet count, , , Once, Franc Portillo MD    losartan (COZAAR) tablet 100 mg, 100 mg, Oral, Daily, 100 mg at 18 0815 **AND** hydrochlorothiazide (HYDRODIURIL) tablet 12 5 mg, 12 5 mg, Oral, Daily, Franc Portillo MD, 12 5 mg at 18    levothyroxine tablet 137 mcg, 137 mcg, Oral, Early Morning, Franc Portillo MD, 137 mcg at 18 0515    lidocaine (LIDODERM) 5 % patch 1 patch, 1 patch, Topical, Daily, Franc Portillo MD, 1 patch at 18    lidocaine (PF) (XYLOCAINE-MPF) 1 % injection 10 mL, 10 mL, Infiltration, Once, Franc Portillo MD    methocarbamol (ROBAXIN) tablet 500 mg, 500 mg, Oral, Q6H PRN, Franc Portillo MD, 500 mg at 18 0145    oxyCODONE (ROXICODONE) IR tablet 5 mg, 5 mg, Oral, Q4H PRN, Franc Portillo MD, 5 mg at 18 0815    oxyCODONE (ROXICODONE) oral solution 2 5 mg, 2 5 mg, Oral, Q4H PRN, Franc Portillo MD, 2 5 mg at 18 014    polyethylene glycol (MIRALAX) packet 17 g, 17 g, Oral, Daily PRN, César Troy PA-C    senna-docusate sodium (SENOKOT S) 8 6-50 mg per tablet 1 tablet, 1 tablet, Oral, HS, Jett Medeiros PA-C    sodium chloride 0 9 % bolus 1,000 mL, 1,000 mL, Intravenous, Once, Jett Medeiros PA-C, Last Rate: 1,000 mL/hr at 18 1040, 1,000 mL at 18 1040    verapamil (CALAN-SR) CR tablet 180 mg, 180 mg, Oral, HS, Franc Portillo MD, 180 mg at 18 3411    Vitals: Blood pressure 115/64, pulse 69, temperature 98 1 °F (36 7 °C), temperature source Oral, resp  rate 17, height 5' 6" (1 676 m), weight 68 5 kg (151 lb), SpO2 98 %  Body mass index is 24 37 kg/m²   SpO2: SpO2: 98 %  Temp (24hrs), Av 3 °F (36 8 °C), Min:98 1 °F (36 7 °C), Max:98 5 °F (36 9 °C)  Current: Temperature: 98 1 °F (36 7 °C)    ABG: No results found for: PHART, DXH2QXP, PO2ART, BBH3CKK, V8MPWMRE, BEART, SOURCE      Intake/Output Summary (Last 24 hours) at 09/29/18 1043  Last data filed at 09/29/18 0537   Gross per 24 hour   Intake             1120 ml   Output                0 ml   Net             1120 ml       Invasive Devices     Peripheral Intravenous Line            Peripheral IV 09/27/18 Right Forearm 1 day                          Nutrition/GI Proph/Bowel Reg:  Regular diet with 1 2 L fluid restriction daily; Senokot and MiraLax    Physical Exam:     GENERAL APPEARANCE: Patient in no acute distress  HEENT: NCAT; PERRL, EOMs intact; Mucous membranes moist  NECK / BACK:  Nontender  CV: Regular rate and rhythm; + S1, S2; no murmur/gallops/rubs appreciated  CHEST / LUNGS: Clear to auscultation; no wheezes/rales/rhonci; tenderness over the right chest wall without crepitus  ABD: NABS; soft; non-distended; non-tender  EXT: +2 pulses bilaterally upper & lower extremities; no clubbing/cyanosis/edema; left upper extremity splint intact with normal neurovascular exam distally and mild to moderate tenderness  NEURO: GCS 15; no focal neurologic deficits; neurovascularly intact  SKIN: Warm, dry and well perfused; no rash; no jaundice  Lab Results:   Results: I have personally reviewed pertinent reports   , BMP/CMP:   Lab Results   Component Value Date     (L) 09/29/2018    K 3 8 09/29/2018    CL 96 (L) 09/29/2018    CO2 27 09/29/2018    BUN 9 09/29/2018    CREATININE 0 54 (L) 09/29/2018    CALCIUM 8 4 09/29/2018    EGFR 95 09/29/2018    and CBC:   Lab Results   Component Value Date    WBC 6 13 09/28/2018    HGB 12 4 09/28/2018    HCT 37 3 09/28/2018    MCV 92 09/28/2018     09/28/2018    MCH 30 7 09/28/2018    MCHC 33 2 09/28/2018    RDW 12 1 09/28/2018    MPV 8 1 (L) 09/28/2018    NRBC 0 09/28/2018     Imaging/EKG Studies: Results: I have personally reviewed pertinent reports      Other Studies: N/A  VTE Prophylaxis:  Subcutaneous heparin    Maxwell Carlin PA-C  9/29/2018 10:43 AM

## 2018-09-29 NOTE — ASSESSMENT & PLAN NOTE
- Hyponatremia with unknown baseline   - Sodium level improved from 128-130 on 09/29/2018  - Patient asymptomatic   - Continue oral diet with fluid restriction of 1200 ml per day  - Will need outpatient follow-up with PCP

## 2018-09-29 NOTE — OCCUPATIONAL THERAPY NOTE
[address]  Evaluation     Patient Name: Anna Cox  [id number] Date: 9/29/2018  Problem List  Patient Active Problem List   Diagnosis    Closed fracture of multiple ribs of right side with routine healing    Complicated laceration of lip    Left radial head fracture    Fall    Hyponatremia     Past Medical History  Past Medical History:   Diagnosis Date    Arthritis     Asthma     Bipolar disorder ([address] )     Cyst of left ovary     Depression     Diverticulitis of colon     Hypertension     Hypothyroidism     Rheumatic fever     history of    Umbilical hernia     Uterine leiomyoma      Past Surgical History  Past Surgical History:   Procedure Laterality Date    ACHILLES TENDON SURGERY      BACK SURGERY      BILATERAL KNEE ARTHROSCOPY      COLONOSCOPY N/A 9/7/2016    Procedure: COLONOSCOPY;  Surgeon: Brennan Douglas MD;  [facility]; Service:     ENDOMETRIAL BIOPSY      BY SUCTION    GANGLION CYST EXCISION      WRIST    HERNIA REPAIR      UMBILICAL HERNIA HERNIORRHAPHY    [place] ERCP W/SPHINCTEROTOMY/PAPILLOTOMY N/A 7/2/2018    Procedure: ENDOSCOPIC RETROGRADE CHOLANGIOPANCREATOGRAPHY (ERCP); Surgeon: Clemencia Rodriges MD;  [facility]: [facility]; Service: Gastroenterology    REPLACEMENT TOTAL KNEE      SPINE SURGERY      TONSILLECTOMY AND ADENOIDECTOMY           09/29/18 0920   Note Type   Note type Eval/Treat   Restrictions/Precautions   Weight Bearing Precautions Per Order Yes   RUE Weight Bearing Per Order WBAT   LUE Weight Bearing Per Order NWB   RLE Weight Bearing Per Order WBAT   LLE Weight Bearing Per Order WBAT   Pain Assessment   Pain Assessment 0-10   Pain Score 6   Pain Type Acute pain   Pain Location Rib cage; Arm   Pain Orientation Right   Hospital Pain Intervention(s) Repositioned; Ambulation/increased activity; Elevated; Emotional support   Home Living   Type of [address] One level;Stairs to enter with rails  ([address])   Bathroom Shower/Tub Tub/shower unit   Bathroom Toilet Standard   Bathroom Accessibility Accessible   Prior Function   Level of [place] Independent with ADLs and functional mobility   Lives With Alone   Receives Help From [place]   IADAngel Independent   Falls in the last 6 months 1 to 4   Vocational Retired   [address] [place] [address] W/O AD - I IADLS    Reciprocal Relationships LIMITED SUPPORT AVAILABLE    Service to Others RETIRED   Intrinsic Gratification ACTIVE PTA - ENJOYS WALKING   Subjective   Subjective "I WAS HOPING I COULD GO RIGHT HOME"   ADL   Eating Assistance 5  Supervision/Setup   Grooming Assistance 4  Minimal Assistance   UB Bathing Assistance 4  Minimal Assistance   LB Bathing Assistance 4  Minimal Assistance   UB Dressing Assistance 4  Minimal Assistance   LB Dressing Assistance 4  Minimal Assistance   Toileting Assistance  4  Minimal Assistance   Bed Mobility   Supine to Sit 5  Supervision   Sit to Supine 5  Supervision   Transfers   Sit to Stand 5  Supervision   Stand to Sit 5  Supervision   Stand pivot 4  Minimal assistance   Additional Comments +ORTHOSTATIC HYPOTENSION   Functional Mobility   Functional Mobility 4  Minimal assistance   Balance   Static Sitting Good   Dynamic Sitting Fair +   Static Standing Fair   Dynamic Standing Fair -   Ambulatory Fair -   Activity Tolerance   Activity Tolerance Patient limited by fatigue;Patient limited by pain;Treatment limited secondary to medical complications (Comment)  (+ORTHOSTATIC HYPOTENSION)   RUE Assessment   RUE Assessment WFL   LUE Assessment   LUE Assessment (IN SLING/SPLINT - DIGITS WFL - SHLD LIMITED 2* RTC TEAR(OLD))   Cognition   Overall Cognitive Status WFL   Assessment   Limitation Decreased ADL status; Decreased UE ROM; Decreased endurance;Decreased self-care trans;Decreased high-level ADLs; Non-func L UE   Prognosis Good   Assessment Pt is a 67 y o  female who was admitted to [facility] on 9/27/2018 with Closed fracture of multiple ribs of right side with routine healing, L radial head fx  Pt's problem list also includes PMH of HTN, previous surgery and arthritis, asthma, bipolar disorder, L ovarian cyst, depression, diverticulitis, hypothyroid, rheumatic fever, umbilical hernia, uterine leiomyoma  At baseline pt was completing adls and mobility independently w/o ad  Pt lives alone with limited social support  Currently pt requires min assist for overall ADLS and min assist for functional mobility/transfers  Pt currently presents with impairments in the following categories -steps to enter environment, limited home support, difficulty performing ADLS, difficulty performing IADLS  and environment activity tolerance, endurance and standing balance/tolerance  These impairments, as well as pt's fatigue, pain, orthopedic restricitions , WBS , decreased caregiver support, risk for falls and home environment  limit pt's ability to safely engage in all baseline areas of occupation, includingbathing, dressing, toileting, functional mobility/transfers, community mobility, laundry , driving, house maintenance, meal prep, cleaning, social participation  and leisure activities  From OT standpoint, recommend inpt rehab as pt lives alone and has limited support available upon D/C  OT will continue to follow to address the below stated goals  Goals   Patient Goals go home    LTG Time Frame 7-10   Long Term Goal #1 refer to established goals below   Plan   Treatment Interventions ADL retraining;Functional transfer training;UE strengthening/ROM; Endurance training;Patient/family training;Equipment evaluation/education; Compensatory technique education; Activityengagement   Goal Expiration Date 10/09/18   OT Frequency 3-5x/wk   Recommendation   OT Discharge Recommendation Short Term Rehab   Barthel Index   Feeding 5   Bathing 0   Grooming Score 0   Dressing Score 5   Bladder Score 10   Bowels Score 10   Toilet Use Score 5 Transfers (Bed/Chair) Score 10   Mobility (Level Surface) Score 0   Stairs Score 0   Barthel Index Score 45       OCCUPATIONAL THERAPY GOALS:    *MOD I ADLS AFTER SETUP WITH USE OF ADAPTIVE DEVICES AND USE OF 1 HANDED TECHNIQUES  *MOD I TOILETING AND CLOTHING MANAGEMENT   *MOD I FUNCTIONAL MOB AND TRANSFERS TO ALL SURFACES WITH FAIR+ TO GOOD BALANCE/SAFETY FOR PARTICIPATION IN DYNAMIC ADLS   *DEMONSTRATE GOOD CARRYOVER WITH SAFE USE OF AD, NWB LUE AND USE OF 1 HANDED TECHNIQUES DURING FUNCTIONAL TASKS  *ASSESS DME NEEDS  *INCREASE ACTIVITY TOLERANCE TO 40-45 MIN FOR PARTICIPATION IN ADLS AND ENJOYABLE ACTIVITIES     * ASSIST WITH SAFE D/C RECOMMENDATIONS     Zoe Lagunas, OT

## 2018-09-29 NOTE — PHYSICAL THERAPY NOTE
Physical Therapy Progress Note     09/29/18 0925   Pain Assessment   Pain Assessment 0-10   Pain Score 7   Pain Type Acute pain   Pain Location Rib cage   Pain Orientation Bilateral   Hospital Pain Intervention(s) Medication (See MAR); Ambulation/increased activity;Repositioned   Restrictions/Precautions   Weight Bearing Precautions Per Order Yes   RUE Weight Bearing Per Order WBAT   LUE Weight Bearing Per Order NWB   RLE Weight Bearing Per Order WBAT   LLE Weight Bearing Per Order WBAT   Braces or Orthoses Sling   Other Precautions Fall Risk;Pain;WBS   General   Chart Reviewed Yes   Response to Previous Treatment Patient with no complaints from previous session  Family/Caregiver Present No   Cognition   Overall Cognitive Status WFL   Arousal/Participation Alert; Responsive   Attention Within functional limits   Orientation Level Oriented X4   Memory Within functional limits   Following Commands Follows all commands and directions without difficulty   Bed Mobility   Supine to Sit 4  Minimal assistance   Additional items Assist x 1; Increased time required;Verbal cues; Bedrails;HOB elevated   Sit to Supine 4  Minimal assistance   Additional items Assist x 1; Increased time required;Verbal cues; Bedrails;HOB elevated   Transfers   Sit to Stand 4  Minimal assistance   Additional items Assist x 1; Increased time required;Verbal cues   Stand to Sit 4  Minimal assistance   Additional items Assist x 1; Increased time required   Ambulation/Elevation   Gait pattern Decreased foot clearance; Improper Weight shift; Excessively slow; Short stride   Gait Assistance 4  Minimal assist   Additional items Assist x 1   Assistive Device None   Distance 3'   Endurance Deficit   Endurance Deficit Yes   Activity Tolerance   Activity Tolerance Treatment limited secondary to medical complications (Comment)   Medical Staff Made Aware UMAIR Frausto notified of BP status   Nurse Made Aware LON Chavez ok to see   Assessment   Prognosis Good   Problem List Decreased strength;Decreased range of motion;Decreased endurance; Impaired balance;Pain   Assessment Pt supine in bed upon arrival, pt is fearful and nervous about having to go home and take care of herself  Pt agreeable to OOB BP supine 122/60 Min assist to sit EOB once seated EOB /58  Pt stood min assit and upon standing pt reports dizziness and lightheadedness and needing to sit  Pt seated in relciner and BP taken multiple times while seated 110/60,94/55,85/55,99/54 Pt reports not feeling well and BP dropping while seated so pt returned to supine in bed where BP slowly returned to normal  LON Kwong and [facility] where both notified of pt orthostatic hypo  Pt will continue to bebenfit from skilled Pt to further increased her stregth and improve balance in order to maximize safe fucntional mobility  Barriers to Discharge Decreased caregiver support; Inaccessible home environment   Barriers to Discharge Comments [address], lives alone   Goals   Patient Goals none stated   STG Expiration Date 10/08/18   Short Term Goal #1 Pt will be mod(I) with rolling R and L for ease with OOB transfer  Pt will be mod(I) with sit<->stand to promote (I) with toileting  Pt will be mod(I) with supine<->sit transfer  Pt will be mod(I) with ambualtion of household distances (appx 50') to reduce caregiver burden  Pt will be S with community distance ambulaiton using least restrictive AD to increase tolerance to activity  Pt will be S ascending and descending 12 steps to gain access into home  Pt will particiapte in HEP consisitng of balance, strength, ROM and coordinaiton tasks to increase activitiy, improve (I) and decrease pain  Pt will recall and demonstrate understanding of % without verbal instruction    Treatment Day 1   Plan   Treatment/Interventions Functional transfer training;LE strengthening/ROM; Elevations; Therapeutic exercise; Endurance training;Bed mobility;Gait training   Progress Slow progress, medical status limitations   PT Frequency (3-6x/week)   Recommendation   Recommendation (Rehab)   PT - OK to Discharge Yes  (to rehab when medically stable)     Jourdan Morales, PTA

## 2018-09-29 NOTE — PLAN OF CARE
Problem: OCCUPATIONAL THERAPY ADULT  Goal: Performs self-care activities at highest level of function for planned discharge setting  See evaluation for individualized goals  Treatment Interventions: ADL retraining, Functional transfer training, UE strengthening/ROM, Endurance training, Patient/family training, Equipment evaluation/education, Compensatory technique education, Activityengagement          See flowsheet documentation for full assessment, interventions and recommendations  Limitation: Decreased ADL status, Decreased UE ROM, Decreased endurance, Decreased self-care trans, Decreased high-level ADLs, Non-func L UE  Prognosis: Good  Assessment: Pt is a 67 y o  female who was admitted to [facility] on 9/27/2018 with Closed fracture of multiple ribs of right side with routine healing, L radial head fx  Pt's problem list also includes PMH of HTN, previous surgery and arthritis, asthma, bipolar disorder, L ovarian cyst, depression, diverticulitis, hypothyroid, rheumatic fever, umbilical hernia, uterine leiomyoma  At baseline pt was completing adls and mobility independently w/o ad  Pt lives alone with limited social support  Currently pt requires min assist for overall ADLS and min assist for functional mobility/transfers  Pt currently presents with impairments in the following categories -steps to enter environment, limited home support, difficulty performing ADLS, difficulty performing IADLS  and environment activity tolerance, endurance and standing balance/tolerance   These impairments, as well as pt's fatigue, pain, orthopedic restricitions , WBS , decreased caregiver support, risk for falls and home environment  limit pt's ability to safely engage in all baseline areas of occupation, includingbathing, dressing, toileting, functional mobility/transfers, community mobility, laundry , driving, house maintenance, meal prep, cleaning, social participation  and leisure activities  From OT standpoint, recommend inpt rehab as pt lives alone and has limited support available upon D/C  OT will continue to follow to address the below stated goals        OT Discharge Recommendation: Short Term Rehab

## 2018-09-29 NOTE — PLAN OF CARE
Problem: PHYSICAL THERAPY ADULT  Goal: Performs mobility at highest level of function for planned discharge setting  See evaluation for individualized goals  Treatment/Interventions: Functional transfer training, LE strengthening/ROM, Therapeutic exercise, Endurance training, Patient/family training, Equipment eval/education, Compensatory technique education, Gait training, Bed mobility, Elevations, Spoke to nursing, Spoke to case management          See flowsheet documentation for full assessment, interventions and recommendations  Outcome: Progressing  Prognosis: Good  Problem List: Decreased strength, Decreased range of motion, Decreased endurance, Impaired balance, Pain  Assessment: Pt supine in bed upon arrival, pt is fearful and nervous about having to go home and take care of herself  Pt agreeable to OOB BP supine 122/60 Min assist to sit EOB once seated EOB /58  Pt stood min assit and upon standing pt reports dizziness and lightheadedness and needing to sit  Pt seated in relciner and BP taken multiple times while seated 110/60,94/55,85/55,99/54 Pt reports not feeling well and BP dropping while seated so pt returned to supine in bed where BP slowly returned to normal  RN Nora Zavala and [address] [place] [address] where both notified of pt orthostatic hypo  Pt will continue to bebenfit from skilled Pt to further increased her stregth and improve balance in order to maximize safe fucntional mobility  Barriers to Discharge: Decreased caregiver support, Inaccessible home environment  Barriers to Discharge Comments: 12 [address], lives alone  Recommendation:  (Rehab)     PT - OK to Discharge: Yes (to rehab when medically stable)    See flowsheet documentation for full assessment

## 2018-09-30 LAB
ANION GAP SERPL CALCULATED.3IONS-SCNC: 5 MMOL/L (ref 4–13)
BUN SERPL-MCNC: 8 MG/DL (ref 5–25)
CALCIUM SERPL-MCNC: 8.4 MG/DL (ref 8.3–10.1)
CHLORIDE SERPL-SCNC: 98 MMOL/L (ref 100–108)
CO2 SERPL-SCNC: 25 MMOL/L (ref 21–32)
CREAT SERPL-MCNC: 0.57 MG/DL (ref 0.6–1.3)
ERYTHROCYTE [DISTWIDTH] IN BLOOD BY AUTOMATED COUNT: 12.1 % (ref 11.6–15.1)
GFR SERPL CREATININE-BSD FRML MDRD: 93 ML/MIN/1.73SQ M
GLUCOSE SERPL-MCNC: 95 MG/DL (ref 65–140)
HCT VFR BLD AUTO: 35.5 % (ref 34.8–46.1)
HGB BLD-MCNC: 11.8 G/DL (ref 11.5–15.4)
MCH RBC QN AUTO: 31.1 PG (ref 26.8–34.3)
MCHC RBC AUTO-ENTMCNC: 33.2 G/DL (ref 31.4–37.4)
MCV RBC AUTO: 94 FL (ref 82–98)
PLATELET # BLD AUTO: 208 THOUSANDS/UL (ref 149–390)
PMV BLD AUTO: 8.6 FL (ref 8.9–12.7)
POTASSIUM SERPL-SCNC: 3.8 MMOL/L (ref 3.5–5.3)
RBC # BLD AUTO: 3.79 MILLION/UL (ref 3.81–5.12)
SODIUM SERPL-SCNC: 128 MMOL/L (ref 136–145)
WBC # BLD AUTO: 5.77 THOUSAND/UL (ref 4.31–10.16)

## 2018-09-30 PROCEDURE — 99225 PR SBSQ OBSERVATION CARE/DAY 25 MINUTES: CPT | Performed by: SURGERY

## 2018-09-30 PROCEDURE — 80048 BASIC METABOLIC PNL TOTAL CA: CPT | Performed by: PHYSICIAN ASSISTANT

## 2018-09-30 PROCEDURE — 97535 SELF CARE MNGMENT TRAINING: CPT

## 2018-09-30 PROCEDURE — 85027 COMPLETE CBC AUTOMATED: CPT | Performed by: PHYSICIAN ASSISTANT

## 2018-09-30 RX ADMIN — METHOCARBAMOL 500 MG: 500 TABLET ORAL at 05:30

## 2018-09-30 RX ADMIN — HEPARIN SODIUM 5000 UNITS: 5000 INJECTION, SOLUTION INTRAVENOUS; SUBCUTANEOUS at 13:41

## 2018-09-30 RX ADMIN — ACETAMINOPHEN 975 MG: 325 TABLET ORAL at 13:41

## 2018-09-30 RX ADMIN — HEPARIN SODIUM 5000 UNITS: 5000 INJECTION, SOLUTION INTRAVENOUS; SUBCUTANEOUS at 05:24

## 2018-09-30 RX ADMIN — BUPROPION HYDROCHLORIDE 100 MG: 100 TABLET, FILM COATED, EXTENDED RELEASE ORAL at 08:58

## 2018-09-30 RX ADMIN — OXYCODONE HYDROCHLORIDE 5 MG: 5 TABLET ORAL at 05:29

## 2018-09-30 RX ADMIN — ACETAMINOPHEN 975 MG: 325 TABLET ORAL at 22:50

## 2018-09-30 RX ADMIN — DOXAZOSIN 4 MG: 4 TABLET ORAL at 22:45

## 2018-09-30 RX ADMIN — LIDOCAINE 1 PATCH: 50 PATCH CUTANEOUS at 22:46

## 2018-09-30 RX ADMIN — CARBAMAZEPINE 100 MG: 200 TABLET ORAL at 22:46

## 2018-09-30 RX ADMIN — CARBAMAZEPINE 100 MG: 200 TABLET ORAL at 20:13

## 2018-09-30 RX ADMIN — OXYCODONE HYDROCHLORIDE 5 MG: 5 TABLET ORAL at 20:15

## 2018-09-30 RX ADMIN — DOCUSATE SODIUM 100 MG: 100 CAPSULE, LIQUID FILLED ORAL at 09:00

## 2018-09-30 RX ADMIN — DOCUSATE SODIUM 100 MG: 100 CAPSULE, LIQUID FILLED ORAL at 20:15

## 2018-09-30 RX ADMIN — METHOCARBAMOL 500 MG: 500 TABLET ORAL at 23:01

## 2018-09-30 RX ADMIN — OXYCODONE HYDROCHLORIDE 5 MG: 5 TABLET ORAL at 00:29

## 2018-09-30 RX ADMIN — METHOCARBAMOL 500 MG: 500 TABLET ORAL at 00:29

## 2018-09-30 RX ADMIN — HEPARIN SODIUM 5000 UNITS: 5000 INJECTION, SOLUTION INTRAVENOUS; SUBCUTANEOUS at 22:45

## 2018-09-30 RX ADMIN — LEVOTHYROXINE SODIUM 137 MCG: 112 TABLET ORAL at 05:23

## 2018-09-30 RX ADMIN — CARBAMAZEPINE 100 MG: 200 TABLET ORAL at 09:01

## 2018-09-30 RX ADMIN — VERAPAMIL HYDROCHLORIDE 180 MG: 180 TABLET, FILM COATED, EXTENDED RELEASE ORAL at 22:49

## 2018-09-30 RX ADMIN — CARBAMAZEPINE 100 MG: 200 TABLET ORAL at 12:24

## 2018-09-30 RX ADMIN — ACETAMINOPHEN 975 MG: 325 TABLET ORAL at 05:24

## 2018-09-30 RX ADMIN — OXYCODONE HYDROCHLORIDE 5 MG: 5 TABLET ORAL at 22:56

## 2018-09-30 RX ADMIN — BUPROPION HYDROCHLORIDE 100 MG: 100 TABLET, FILM COATED, EXTENDED RELEASE ORAL at 20:16

## 2018-09-30 RX ADMIN — ASPIRIN 81 MG: 81 TABLET, COATED ORAL at 09:00

## 2018-09-30 NOTE — ASSESSMENT & PLAN NOTE
- Hyponatremia with unknown baseline   - Sodium level is again 128 on 09/30/2018  - Patient remains asymptomatic   - Continue oral diet with fluid restriction of 1200 ml per day  - Will need outpatient follow-up with PCP

## 2018-09-30 NOTE — PLAN OF CARE
Problem: OCCUPATIONAL THERAPY ADULT  Goal: Performs self-care activities at highest level of function for planned discharge setting  See evaluation for individualized goals  Treatment Interventions: ADL retraining, Functional transfer training, UE strengthening/ROM, Endurance training, Patient/family training, Equipment evaluation/education, Compensatory technique education, Activityengagement          See flowsheet documentation for full assessment, interventions and recommendations  Outcome: Progressing  Limitation: Decreased ADL status, Decreased UE ROM, Decreased endurance, Decreased self-care trans, Decreased high-level ADLs, Non-func L UE  Prognosis: Good  Assessment: pt  seen for OT session focusing on UB/LB dressing, transfers, homemaking and functional mobility  pt  is impuslive and talkative t/o session  pt  needs Min Asst for UB dressing to don gown 2* to splint on LUE  pt  requires supervision for LB dressing to don pants and socks  pt  requires supervision for transfers  pt  needs CTG/Min Asst for functional mobility as pt  has good minus balance  pt  performed light homemaking activity seated in chair and performed closet reaching with supervision for dynamic reaching  pt  will continue to benefit from skilled OT services to maximize independence        OT Discharge Recommendation: Short Term Rehab    Greg Cruz

## 2018-09-30 NOTE — ASSESSMENT & PLAN NOTE
- Patient had been having presyncopal symptoms with positional changes and is noted to have positive orthostatics hypertension during her therapy evaluation on 09/29/2018  - She denies any recurrent symptoms following fluid bolus on 09/29/2018  Repeat orthostatic vital signs to be obtained today, 09/30/2018  - Patient cautioned to take her time when getting up and spend additional time sitting on the side of bed prior to standing as well as to call for help and get up only with assistance  - No evidence of anemia with stable hemoglobin the last 2 days  Repeat CBC on 09/30/2018 demonstrated minimal decrease in hemoglobin  No further indication for serial hemoglobins as well as patient remains asymptomatic

## 2018-09-30 NOTE — CASE MANAGEMENT
Continued Stay Review    Date: 09/30/18    Vital Signs: BP (!) 86/51   Pulse 60   Temp 97 5 °F (36 4 °C) (Oral)   Resp 19   Ht 5' 6" (1 676 m)   Wt 68 5 kg (151 lb)   SpO2 99%   BMI 24 37 kg/m²     Medications:   Scheduled Meds:   Current Facility-Administered Medications:  acetaminophen 975 mg Oral [facility] Facundo Snell MD   albuterol 1 puff Inhalation Q6H PRN Facundo Snell MD   aspirin 81 mg Oral Daily Facundo Snell MD   buPROPion 100 mg Oral Q12H [facility] Facundo Snell MD   carBAMazepine 100 mg Oral 4x Daily Facundo Snell MD   docusate sodium 100 mg Oral BID Papo Rojas PA-C   doxazosin 4 mg Oral HS Facundo Snell MD   fluticasone 2 puff Inhalation Q12H [facility] Facundo Snell MD   heparin (porcine) 5,000 Units Subcutaneous [facility] Facundo Snell MD   losartan 100 mg Oral Daily Facundo Snell MD   And       hydrochlorothiazide 12 5 mg Oral Daily Facundo Snell MD   levothyroxine 137 mcg Oral Early Morning Facundo Snell MD   lidocaine 1 patch Topical Daily Facundo Snell MD   lidocaine (PF) 10 mL Infiltration Once Facundo Snell MD   methocarbamol 500 mg Oral Q6H PRN Facundo Snell MD   oxyCODONE 5 mg Oral Q4H PRN Facundo Snell MD   oxyCODONE 2 5 mg Oral Q4H PRN Facundo Snell MD   polyethylene glycol 17 g Oral Daily PRN Papo Rojas PA-C   verapamil 180 mg Oral HS Facundo Snell MD     PRN Meds: albuterol    Methocarbamol 500 mg po 9/29 x1, 9/30 x2    oxyCODONE 2 5 mg po 9/29 x1    oxyCODONE 5 mg po 9/28 x2, 9/29 x1, 9/30 x2    polyethylene glycol    Abnormal Labs/Diagnostic Results: 9/30 -- Na 128, CL 98, Cr 0 57    Age/Sex: 67 y o  female     Assessment/Plan:   Fall   Assessment & Plan     - Status post mechanical fall down multiple concrete steps  - Fall precautions    - Continue PT and OT evaluation and treatment as indicated       * Closed fracture of multiple ribs of right side with routine healing   Assessment & Plan     - Multiple right-sided rib fractures (# 4-6)   - Continue rib fracture protocol   - Continue to encourage aggressive incentive spirometer use and adequate pulmonary hygiene  - Continue activity in being out of bed frequently  - Monitor oxygen saturation and provide supplemental O2 as needed to maintain O2 saturation greater than or equal to 94%  - Continue multimodal analgesic regimen  - Chest x-ray findings from 09/28/2018 reviewed, but still awaiting final radiology interpretation       Left radial head fracture   Assessment & Plan     - Appreciate orthopedic surgery evaluation and recommendations   - Non-operative management per Ortho  - Maintain left upper extremity splint splint and nonweightbearing status on the left upper extremity   - Stable for discharge from Orthopedic standpoint   - Outpatient follow-up with Orthopedics in 2 weeks       Complicated laceration of lip   Assessment & Plan     - Lip laceration status post suture repair   - Continue local wound care  - Sutures will dissolve       Hyponatremia   Assessment & Plan     - Hyponatremia with unknown baseline   - Sodium level is again 128 on 09/30/2018  - Patient remains asymptomatic   - Continue oral diet with fluid restriction of 1200 ml per day  - Will need outpatient follow-up with PCP       Orthostatic hypotension   Assessment & Plan     - Patient had been having presyncopal symptoms with positional changes and is noted to have positive orthostatics hypertension during her therapy evaluation on 09/29/2018  - She denies any recurrent symptoms following fluid bolus on 09/29/2018  Repeat orthostatic vital signs to be obtained today, 09/30/2018  - Patient cautioned to take her time when getting up and spend additional time sitting on the side of bed prior to standing as well as to call for help and get up only with assistance  - No evidence of anemia with stable hemoglobin the last 2 days  Repeat CBC on 09/30/2018 demonstrated minimal decrease in hemoglobin    No further indication for serial hemoglobins as well as patient remains asymptomatic       Constipation   Assessment & Plan     - Mild constipation without abdominal discomfort or distention   - Continue bowel regimen today with Colace    - MiraLax as needed              Discharge Plan: D/C to IP Rehab when medically stable

## 2018-09-30 NOTE — ASSESSMENT & PLAN NOTE
- Mild constipation without abdominal discomfort or distention   - Continue bowel regimen today with Colace  - MiraLax as needed

## 2018-09-30 NOTE — PROGRESS NOTES
Progress Note - Dorothy Zamora 1945, 67 y o  female MRN: [id number]    [facility] Encounter: [id number]    Primary Care Provider: Salina Ballesteros DO   Date and time admitted to hospital: 9/27/2018 12:37 PM        Fall   Assessment & Plan    - Status post mechanical fall down multiple concrete steps  - Fall precautions  - Continue PT and OT evaluation and treatment as indicated  * Closed fracture of multiple ribs of right side with routine healing   Assessment & Plan    - Multiple right-sided rib fractures (# 4-6)  - Continue rib fracture protocol   - Continue to encourage aggressive incentive spirometer use and adequate pulmonary hygiene  - Continue activity in being out of bed frequently  - Monitor oxygen saturation and provide supplemental O2 as needed to maintain O2 saturation greater than or equal to 94%  - Continue multimodal analgesic regimen  - Chest x-ray findings from 09/28/2018 reviewed, but still awaiting final radiology interpretation  Left radial head fracture   Assessment & Plan    - Appreciate orthopedic surgery evaluation and recommendations   - Non-operative management per Ortho  - Maintain left upper extremity splint splint and nonweightbearing status on the left upper extremity   - Stable for discharge from Orthopedic standpoint   - Outpatient follow-up with Orthopedics in 2 weeks  Complicated laceration of lip   Assessment & Plan    - Lip laceration status post suture repair   - Continue local wound care  - Sutures will dissolve  Hyponatremia   Assessment & Plan    - Hyponatremia with unknown baseline   - Sodium level is again 128 on 09/30/2018  - Patient remains asymptomatic   - Continue oral diet with fluid restriction of 1200 ml per day  - Will need outpatient follow-up with PCP       Orthostatic hypotension   Assessment & Plan    - Patient had been having presyncopal symptoms with positional changes and is noted to have positive orthostatics hypertension during her therapy evaluation on 09/29/2018  - She denies any recurrent symptoms following fluid bolus on 09/29/2018  Repeat orthostatic vital signs to be obtained today, 09/30/2018  - Patient cautioned to take her time when getting up and spend additional time sitting on the side of bed prior to standing as well as to call for help and get up only with assistance  - No evidence of anemia with stable hemoglobin the last 2 days  Repeat CBC on 09/30/2018 demonstrated minimal decrease in hemoglobin  No further indication for serial hemoglobins as well as patient remains asymptomatic  Constipation   Assessment & Plan    - Mild constipation without abdominal discomfort or distention   - Continue bowel regimen today with Colace  - MiraLax as needed  Nurse-patient-provider rounds completed today with the patient's nurse, Robin Stern  Disposition:  Anticipate discharge to rehab in the next 24 hours pending placement  Case Management following  Subjective:  Patient reports feeling better today  She did have some chest wall pain with coughing overnight, but notes overall her pain remains controlled with her current medication regimen  She is tolerating her diet without nausea or vomiting  She denies any recurrent lightheadedness or dizziness following IV fluid administration yesterday  Objective:    Meds/Allergies   Prescriptions Prior to Admission   Medication Sig Dispense Refill Last Dose    ALBUTEROL SULFATE HFA IN Inhale as needed   9/27/2018 at Unknown time    aspirin (ECOTRIN LOW STRENGTH) 81 mg EC tablet Take 81 mg by mouth daily  Past Month at Unknown time    BUPROPION HCL ER, SR, PO Take 300 mg by mouth daily     9/27/2018 at Unknown time    Calcium Carbonate-Vitamin D (CALTRATE 600+D PO) Take 1 tablet by mouth daily  9/27/2018 at Unknown time    carBAMazepine (TEGretol) 200 mg tablet Take 100 mg by mouth 4 (four) times a day     9/27/2018 at Unknown time    Cholecalciferol (VITAMIN D3) 1000 UNITS CAPS Take 1 capsule by mouth daily  9/27/2018 at Unknown time    doxazosin (CARDURA) 2 mg tablet Take 2 mg by mouth daily at bedtime     9/27/2018 at Unknown time    fluticasone (FLOVENT HFA) 220 mcg/act inhaler Inhale 2 puffs 2 (two) times a day as needed Rinse mouth after use    9/27/2018 at Unknown time    Levothyroxine Sodium 137 MCG CAPS Take 1 tablet by mouth Indications: 1T 5x/week  9/27/2018 at Unknown time    Omega-3 Fatty Acids (OMEGA-3 FISH OIL PO) Take 1 capsule by mouth daily Indications: 1000-300mg  Past Month at Unknown time    valsartan-hydrochlorothiazide (DIOVAN-HCT) 320-12 5 MG per tablet Take 1 tablet by mouth daily  9/27/2018 at Unknown time    verapamil (CALAN-SR) 180 mg CR tablet Take 180 mg by mouth daily at bedtime     9/27/2018 at Unknown time       Current Facility-Administered Medications:     acetaminophen (TYLENOL) tablet 975 mg, 975 mg, Oral, Q8H [address], Gayla Tinsley MD, 975 mg at 09/30/18 0524    albuterol (PROVENTIL HFA,VENTOLIN HFA) inhaler 1 puff, 1 puff, Inhalation, Q6H PRN, Gayla Tinsley MD    aspirin (ECOTRIN LOW STRENGTH) EC tablet 81 mg, 81 mg, Oral, Daily, Gayla Tinsley MD, 81 mg at 09/30/18 0900    buPROPion (WELLBUTRIN SR) 12 hr tablet 100 mg, 100 mg, Oral, Q12H [address], Gayla Tinsley MD, 100 mg at 09/30/18 0858    carBAMazepine (TEGretol) tablet 100 mg, 100 mg, Oral, 4x Daily, Gayla Tinsley MD, 100 mg at 09/30/18 0901    docusate sodium (COLACE) capsule 100 mg, 100 mg, Oral, BID, Jett Medeiros PA-C, 100 mg at 09/30/18 0900    doxazosin (CARDURA) tablet 4 mg, 4 mg, Oral, HS, Gayla Tinsley MD, 4 mg at 09/29/18 2115    fluticasone (FLOVENT HFA) 220 mcg/act inhaler 2 puff, 2 puff, Inhalation, Q12H [address], Gayla Tinsley MD    heparin (porcine) subcutaneous injection 5,000 Units, 5,000 Units, Subcutaneous, Q8H [address], 5,000 Units at 09/30/18 0524 **AND** Platelet count, , , Once, MD Anam Mendenhall Mariano losartan (COZAAR) tablet 100 mg, 100 mg, Oral, Daily, 100 mg at 18 0815 **AND** hydrochlorothiazide (HYDRODIURIL) tablet 12 5 mg, 12 5 mg, Oral, Daily, Cadence Portillo MD, 12 5 mg at 18 0815    levothyroxine tablet 137 mcg, 137 mcg, Oral, Early Morning, Cadence Portillo MD, 137 mcg at 18 0523    lidocaine (LIDODERM) 5 % patch 1 patch, 1 patch, Topical, Daily, Cadence Portillo MD, 1 patch at 18 2230    lidocaine (PF) (XYLOCAINE-MPF) 1 % injection 10 mL, 10 mL, Infiltration, Once, Cadence Portillo MD    methocarbamol (ROBAXIN) tablet 500 mg, 500 mg, Oral, Q6H PRN, Cadence Portillo MD, 500 mg at 18 0530    oxyCODONE (ROXICODONE) IR tablet 5 mg, 5 mg, Oral, Q4H PRN, Cadence Portillo MD, 5 mg at 18 7422    oxyCODONE (ROXICODONE) oral solution 2 5 mg, 2 5 mg, Oral, Q4H PRN, Cadence Portillo MD, 2 5 mg at 18 0145    polyethylene glycol (MIRALAX) packet 17 g, 17 g, Oral, Daily PRN, Jenny Gillis PA-C    verapamil (CALAN-SR) CR tablet 180 mg, 180 mg, Oral, HS, Cadence Portillo MD, 180 mg at 181    Vitals: Blood pressure (!) 86/51, pulse 60, temperature 97 5 °F (36 4 °C), temperature source Oral, resp  rate 19, height 5' 6" (1 676 m), weight 68 5 kg (151 lb), SpO2 99 %  Body mass index is 24 37 kg/m²   SpO2: SpO2: 99 %  Temp (24hrs), Av °F (36 7 °C), Min:97 5 °F (36 4 °C), Max:98 3 °F (36 8 °C)  Current: Temperature: 97 5 °F (36 4 °C)    ABG: No results found for: PHART, BYA8LOM, PO2ART, TPA0LLS, G7CIISOZ, BEART, SOURCE      Intake/Output Summary (Last 24 hours) at 18 1005  Last data filed at 18 0900   Gross per 24 hour   Intake              890 ml   Output              680 ml   Net              210 ml       Invasive Devices     Peripheral Intravenous Line            Peripheral IV 18 Right Forearm 2 days                          Nutrition/GI Proph/Bowel Reg:  Regular diet with 1 2 L fluid restriction daily; Colace and MiraLax as needed    Physical Exam:     GENERAL APPEARANCE: Patient in no acute distress  HEENT: NCAT; PERRL, EOMs intact; Mucous membranes moist  NECK / BACK:  Nontender  CV: Regular rate and rhythm; + S1, S2; no murmur/gallops/rubs appreciated  CHEST / LUNGS: Clear to auscultation; no wheezes/rales/rhonci; mild right chest wall tenderness  ABD: NABS; soft; non-distended; non-tender  EXT: +2 pulses bilaterally upper & lower extremities; no clubbing/cyanosis; left upper extremity splint and overlying dressing intact with decreasing tenderness and normal neurovascular exam distally; superficial abrasions to the right upper extremity are healing appropriately  NEURO: GCS 15; no focal neurologic deficits; neurovascularly intact  SKIN: Warm, dry and well perfused; no rash; no jaundice  Lab Results:   Results: I have personally reviewed pertinent reports   , BMP/CMP:   Lab Results   Component Value Date     (L) 09/30/2018    K 3 8 09/30/2018    CL 98 (L) 09/30/2018    CO2 25 09/30/2018    BUN 8 09/30/2018    CREATININE 0 57 (L) 09/30/2018    CALCIUM 8 4 09/30/2018    EGFR 93 09/30/2018    and CBC:   Lab Results   Component Value Date    WBC 5 77 09/30/2018    HGB 11 8 09/30/2018    HCT 35 5 09/30/2018    MCV 94 09/30/2018     09/30/2018    MCH 31 1 09/30/2018    MCHC 33 2 09/30/2018    RDW 12 1 09/30/2018    MPV 8 6 (L) 09/30/2018     Imaging/EKG Studies: Results: I have personally reviewed pertinent reports      Other Studies: N/A  VTE Prophylaxis:  Subcutaneous heparin    Jerilyn Spencer PA-C  9/30/2018 10:05 AM

## 2018-09-30 NOTE — OCCUPATIONAL THERAPY NOTE
Occupational Therapy Treatment Note:       09/30/18 1200   Restrictions/Precautions   Weight Bearing Precautions Per Order Yes   RUE Weight Bearing Per Order WBAT   LUE Weight Bearing Per Order NWB   RLE Weight Bearing Per Order WBAT   LLE Weight Bearing Per Order WBAT   Braces or Orthoses Sling   Other Precautions Fall Risk;Impulsive;Pain   Pain Assessment   Pain Assessment No/denies pain   Pain Score No Pain   ADL   UB Dressing Assistance 4  Minimal Assistance   UB Dressing Deficit Thread LUE; Increased time to complete; Impulsive   UB Dressing Comments standing bedside   LB Dressing Assistance 5  Supervision/Setup   LB Dressing Deficit Setup; Impulsive   LB Dressing Comments seated EOB   Functional Standing Tolerance   Time 5 mins good minus balance   Activity performing light homemaking activity and dynamic closet reaching   Transfers   Sit to Stand 5  Supervision   Additional items Impulsive   Stand to Sit 5  Supervision   Additional items Impulsive   Additional Comments + ORTHOSTATIC HYPOTENSION   Functional Mobility   Functional Mobility 4  Minimal assistance   Additional Comments CTG for balance and impulsivity   Additional items (None)   Cognition   Arousal/Participation Alert; Responsive; Cooperative   Attention Attends with cues to redirect   Following Commands Follows multistep commands with increased time or repetition   Comments pt  very talkative, needs mod cues to redirect to task   Activity Tolerance   Activity Tolerance Patient tolerated treatment well   Assessment   Assessment pt  seen for OT session focusing on UB/LB dressing, transfers, homemaking and functional mobility  pt  is impuslive and talkative t/o session  pt  needs Min Asst for UB dressing to don gown 2* to splint on LUE  pt  requires supervision for LB dressing to don pants and socks  pt  requires supervision for transfers  pt  needs CTG/Min Asst for functional mobility as pt  has good minus balance   pt  performed light homemaking activity seated in chair and performed closet reaching with supervision for dynamic reaching  pt  will continue to benefit from skilled OT services to maximize independence  Plan   Treatment Interventions ADL retraining;Functional transfer training;Patient/family training; Compensatory technique education; Energy conservation; Activityengagement   Goal Expiration Date 10/09/18   Treatment Day 1   OT Frequency 3-5x/wk   Recommendation   OT Discharge Recommendation Short Term Rehab   Barthel Index   Feeding 5   Bathing 0   Grooming Score 0   Dressing Score 5   Bladder Score 10   Bowels Score 10   Toilet Use Score 5   Transfers (Bed/Chair) Score 10   Mobility (Level Surface) Score 0   Stairs Score 0   Barthel Index Score 45   Angie Mackenzie

## 2018-09-30 NOTE — PROGRESS NOTES
Rounds done with Marlon Hayes  Patient has no complaints  Did well overnight  Waiting for placement  Continue to monitor

## 2018-10-01 LAB
ANION GAP SERPL CALCULATED.3IONS-SCNC: 6 MMOL/L (ref 4–13)
BUN SERPL-MCNC: 8 MG/DL (ref 5–25)
CALCIUM SERPL-MCNC: 8.2 MG/DL (ref 8.3–10.1)
CHLORIDE SERPL-SCNC: 100 MMOL/L (ref 100–108)
CO2 SERPL-SCNC: 25 MMOL/L (ref 21–32)
CREAT SERPL-MCNC: 0.7 MG/DL (ref 0.6–1.3)
GFR SERPL CREATININE-BSD FRML MDRD: 87 ML/MIN/1.73SQ M
GLUCOSE SERPL-MCNC: 99 MG/DL (ref 65–140)
POTASSIUM SERPL-SCNC: 3.6 MMOL/L (ref 3.5–5.3)
SODIUM SERPL-SCNC: 131 MMOL/L (ref 136–145)

## 2018-10-01 PROCEDURE — 99232 SBSQ HOSP IP/OBS MODERATE 35: CPT | Performed by: PHYSICIAN ASSISTANT

## 2018-10-01 PROCEDURE — 97530 THERAPEUTIC ACTIVITIES: CPT

## 2018-10-01 PROCEDURE — 97116 GAIT TRAINING THERAPY: CPT

## 2018-10-01 PROCEDURE — 80048 BASIC METABOLIC PNL TOTAL CA: CPT | Performed by: PHYSICIAN ASSISTANT

## 2018-10-01 RX ADMIN — CARBAMAZEPINE 100 MG: 200 TABLET ORAL at 21:40

## 2018-10-01 RX ADMIN — ACETAMINOPHEN 975 MG: 325 TABLET ORAL at 13:40

## 2018-10-01 RX ADMIN — VERAPAMIL HYDROCHLORIDE 180 MG: 180 TABLET, FILM COATED, EXTENDED RELEASE ORAL at 21:36

## 2018-10-01 RX ADMIN — HEPARIN SODIUM 5000 UNITS: 5000 INJECTION, SOLUTION INTRAVENOUS; SUBCUTANEOUS at 13:40

## 2018-10-01 RX ADMIN — OXYCODONE HYDROCHLORIDE 5 MG: 5 TABLET ORAL at 05:35

## 2018-10-01 RX ADMIN — CARBAMAZEPINE 100 MG: 200 TABLET ORAL at 11:45

## 2018-10-01 RX ADMIN — HEPARIN SODIUM 5000 UNITS: 5000 INJECTION, SOLUTION INTRAVENOUS; SUBCUTANEOUS at 05:26

## 2018-10-01 RX ADMIN — HEPARIN SODIUM 5000 UNITS: 5000 INJECTION, SOLUTION INTRAVENOUS; SUBCUTANEOUS at 21:38

## 2018-10-01 RX ADMIN — ACETAMINOPHEN 975 MG: 325 TABLET ORAL at 05:26

## 2018-10-01 RX ADMIN — LEVOTHYROXINE SODIUM 137 MCG: 112 TABLET ORAL at 05:26

## 2018-10-01 RX ADMIN — DOXAZOSIN 4 MG: 4 TABLET ORAL at 21:38

## 2018-10-01 RX ADMIN — ASPIRIN 81 MG: 81 TABLET, COATED ORAL at 08:08

## 2018-10-01 RX ADMIN — LIDOCAINE 1 PATCH: 50 PATCH CUTANEOUS at 21:39

## 2018-10-01 RX ADMIN — BUPROPION HYDROCHLORIDE 100 MG: 100 TABLET, FILM COATED, EXTENDED RELEASE ORAL at 08:06

## 2018-10-01 RX ADMIN — DOCUSATE SODIUM 100 MG: 100 CAPSULE, LIQUID FILLED ORAL at 08:08

## 2018-10-01 RX ADMIN — ACETAMINOPHEN 975 MG: 325 TABLET ORAL at 21:35

## 2018-10-01 RX ADMIN — BUPROPION HYDROCHLORIDE 100 MG: 100 TABLET, FILM COATED, EXTENDED RELEASE ORAL at 21:35

## 2018-10-01 RX ADMIN — DOCUSATE SODIUM 100 MG: 100 CAPSULE, LIQUID FILLED ORAL at 18:09

## 2018-10-01 RX ADMIN — METHOCARBAMOL 500 MG: 500 TABLET ORAL at 05:35

## 2018-10-01 RX ADMIN — CARBAMAZEPINE 100 MG: 200 TABLET ORAL at 08:07

## 2018-10-01 RX ADMIN — CARBAMAZEPINE 100 MG: 200 TABLET ORAL at 18:09

## 2018-10-01 NOTE — ASSESSMENT & PLAN NOTE
- Hyponatremia of unclear etiology  No significant hyponatremia at baseline after discussion with PCP   - Patient remains asymptomatic   - Continue oral diet with fluid restriction of 1200 ml per day  - Will need outpatient follow-up with PCP for additional workup; I did speak with Dr Subhash Lomax on 10/01/2018 who will perform any additional workup needed

## 2018-10-01 NOTE — PLAN OF CARE
Problem: PHYSICAL THERAPY ADULT  Goal: Performs mobility at highest level of function for planned discharge setting  See evaluation for individualized goals  Treatment/Interventions: Functional transfer training, LE strengthening/ROM, Therapeutic exercise, Endurance training, Patient/family training, Equipment eval/education, Compensatory technique education, Gait training, Bed mobility, Elevations, Spoke to nursing, Spoke to case management          See flowsheet documentation for full assessment, interventions and recommendations  Outcome: Progressing  Prognosis: Good  Problem List: Decreased strength, Decreased range of motion, Decreased endurance, Impaired balance, Decreased mobility, Decreased safety awareness, Pain, Orthopedic restrictions  Assessment: Pt performed all transfers with decreased assist today, but was unable to maintain NWB LUE during supine to sit transfer without assist   Pt also utilized LUE to grab items & adjust seated position at EOB  Educated pt on importance of NWB for proper healing  Pt verbalized understanding, but stated it is hard not to use both arms with daily tasks  Donned sling in seated position to discourage further use during session  No complaints of syncope this session or demonstrated LOB when seated or standing  Pt performed mobility tasks in room, then hallway without incident, then ambulated on steps as noted above  Chief complaint at this time is pain in ribs which also limits breathing, especially on steps  At this time, pt is making progress with all functional mobility tasks, but will continue to benefit from inpatient therapy services at this time to improve activity tolerance without signs/symptoms of syncope and maintain NWB LUE with all functional tasks to ensure proper healing and safety as pt does not have assist at home at this time    Barriers to Discharge: Decreased caregiver support  Barriers to Discharge Comments: 15 [address], lives alone  Recommendation: Post acute IP rehab (at this time, pending progress)     PT - OK to Discharge: Yes (to rehab when medically stable)    See flowsheet documentation for full assessment

## 2018-10-01 NOTE — PHYSICAL THERAPY NOTE
Physical Therapy Progress Note     10/01/18 1102   Pain Assessment   Pain Assessment 0-10   Pain Score 5   Pain Type Acute pain   Pain Location Rib cage   Pain Orientation Right   Effect of Pain on Daily Activities decreased deep breathing, coughing   Patient's Stated Pain Goal No pain   Hospital Pain Intervention(s) Cold applied; Ambulation/increased activity; Elevated;Repositioned; Rest   Response to Interventions tolerated   Restrictions/Precautions   LUE Weight Bearing Per Order NWB   Braces or Orthoses Sling   Other Precautions Fall Risk;Impulsive;Pain;WBS   General   Family/Caregiver Present No   Subjective   Subjective No complaints of syncope this session, but at one point pt stated she felt "weak" during standing  States LUE doesn't bother her, but ribs are always painful  Bed Mobility   Supine to Sit 4  Minimal assistance   Additional items Assist x 1; Increased time required; Impulsive   Transfers   Sit to Stand 5  Supervision   Additional items Assist x 1; Increased time required   Stand to Sit 5  Supervision   Additional items Assist x 1; Increased time required   Stand pivot 5  Supervision   Additional items Assist x 1; Increased time required   Toilet transfer 5  Supervision   Additional items Assist x 1; Increased time required   Ambulation/Elevation   Gait pattern Decreased foot clearance; Short stride; Improper Weight shift; Excessively slow   Gait Assistance 5  Supervision   Additional items Assist x 1   Assistive Device None   Distance 3', 5', 40' x 2, 50' x 2   Stair Management Assistance 5  Supervision   Additional items Assist x 1   Stair Management Technique One rail R;Alternating pattern; Step to pattern; Foreward; Sideways  (forward reciprocal ascending, sideways descending)   Number of Stairs 14   Balance   Static Sitting Good   Static Standing Fair   Ambulatory Fair -   Endurance Deficit   Endurance Deficit Yes   Endurance Deficit Description pain, observed SOB   Activity Tolerance   Activity Tolerance Patient tolerated treatment well;Patient limited by pain   Nurse Dinora Dave RN   Assessment   Prognosis Good   Problem List Decreased strength;Decreased range of motion;Decreased endurance; Impaired balance;Decreased mobility; Decreased safety awareness;Pain;Orthopedic restrictions   Assessment Pt performed all transfers with decreased assist today, but was unable to maintain NWB LUE during supine to sit transfer without assist   Pt also utilized LUE to grab items & adjust seated position at EOB  Educated pt on importance of NWB for proper healing  Pt verbalized understanding, but stated it is hard not to use both arms with daily tasks  Donned sling in seated position to discourage further use during session  No complaints of syncope this session or demonstrated LOB when seated or standing  Pt performed mobility tasks in room, then hallway without incident, then ambulated on steps as noted above  Chief complaint at this time is pain in ribs which also limits breathing, especially on steps  At this time, pt is making progress with all functional mobility tasks, but will continue to benefit from inpatient therapy services at this time to improve activity tolerance without signs/symptoms of syncope and maintain NWB LUE with all functional tasks to ensure proper healing and safety as pt does not have assist at home at this time  Barriers to Discharge Decreased caregiver support   Goals   Patient Goals to get out of hospital   STG Expiration Date 10/08/18   Short Term Goal #1 as per PT eval:  Pt will be mod(I) with rolling R and L for ease with OOB transfer  Pt will be mod(I) with sit<->stand to promote (I) with toileting  Pt will be mod(I) with supine<->sit transfer  Pt will be mod(I) with ambualtion of household distances (appx 50') to reduce caregiver burden  Pt will be S with community distance ambulaiton using least restrictive AD to increase tolerance to activity   Pt will be S ascending and descending 12 steps to gain access into home  Pt will particiapte in HEP consisitng of balance, strength, ROM and coordinaiton tasks to increase activitiy, improve (I) and decrease pain  Pt will recall and demonstrate understanding of % without verbal instruction    Treatment Day 2   Plan   Treatment/Interventions Functional transfer training;LE strengthening/ROM; Elevations; Therapeutic exercise; Endurance training;Patient/family training;Equipment eval/education; Bed mobility;Gait training   Progress Progressing toward goals   PT Frequency (3-6x/week)   Recommendation   Recommendation Post acute IP rehab  (at this time, pending progress)     Millicent Lobo, PTA

## 2018-10-01 NOTE — PHYSICIAN ADVISOR
Current patient class: Observation  The patient is currently on Hospital Day: 4 at [address] [place] [address]        The patient was admitted to the hospital  on N/A at N/A for the following diagnosis:  Lip laceration, initial encounter [S01 511A]  Injury, unspecified, initial encounter [T14 90XA]  Closed nondisplaced fracture of head of left radius, initial encounter [S52 125A]  Unspecified multiple injuries, initial encounter [T07  XXXA]     After review of the relevant documentation, labs, vital signs and test results, the patient is most appropriate for OBSERVATION STATUS  Rationale is as follows: The patient is a 67 yrs   Female who presented to the ED at 9/27/2018 12:37 PM with a chief complaint of Fall (pt fell down a flight of cement steps  denies CP, SOB, dizziness  pt has laceration to inside of lower lip, abrasions to bilateral arms )     The patient was admitted with right 4=6 rib fx, lip laceration, left radial head fracture  The plan of care includes orthopedics consultation, rib fracture pain protocol, oral pain medication  The patient has persistent hyponatremia the plan of care is fluid restriction There is non-operative intervention for radial head fracture  This patient is appropriate for OBSERVATION status       The patients vitals on arrival were ED Triage Vitals [09/27/18 1236]   Temperature Pulse Respirations Blood Pressure SpO2   (!) 96 8 °F (36 °C) 86 20 113/58 95 %      Temp Source Heart Rate Source Patient Position - Orthostatic VS BP Location FiO2 (%)   Tympanic Monitor Sitting Left arm --      Pain Score       Worst Possible Pain           Past Medical History:   Diagnosis Date    Arthritis     Asthma     Bipolar disorder (HCC)     Cyst of left ovary     Depression     Diverticulitis of colon     Hypertension     Hypothyroidism     Rheumatic fever     history of    Umbilical hernia     Uterine leiomyoma      Past Surgical History:   Procedure Laterality Date    ACHILLES TENDON SURGERY      BACK SURGERY      BILATERAL KNEE ARTHROSCOPY      COLONOSCOPY N/A 9/7/2016    Procedure: COLONOSCOPY;  Surgeon: Britta Jane MD;  Location: [facility]; Service:     ENDOMETRIAL BIOPSY      BY SUCTION    GANGLION CYST EXCISION      WRIST    HERNIA REPAIR      UMBILICAL HERNIA HERNIORRHAPHY    OH ERCP W/SPHINCTEROTOMY/PAPILLOTOMY N/A 7/2/2018    Procedure: ENDOSCOPIC RETROGRADE CHOLANGIOPANCREATOGRAPHY (ERCP); Surgeon: Allison Roberson MD;  Location: [facility];   Service: Gastroenterology    REPLACEMENT TOTAL KNEE      SPINE SURGERY      TONSILLECTOMY AND ADENOIDECTOMY             Consults have been placed to:   IP CONSULT TO ORTHOPEDIC SURGERY  IP CONSULT TO CASE MANAGEMENT    Vitals:    09/30/18 0956 09/30/18 0957 09/30/18 0959 09/30/18 1510   BP: 120/60 106/57 (!) 86/51 112/57   BP Location:    Right arm   Pulse:    75   Resp:    18   Temp:    98 1 °F (36 7 °C)   TempSrc:    Oral   SpO2:    99%   Weight:       Height:           Most recent labs:    Recent Labs      09/30/18   0446   WBC  5 77   HGB  11 8   HCT  35 5   PLT  208   K  3 8   NA  128*   CALCIUM  8 4   BUN  8   CREATININE  0 57*       Scheduled Meds:  Current Facility-Administered Medications:  acetaminophen 975 mg Oral [facility] Gayla Tinsley MD   albuterol 1 puff Inhalation Q6H PRN Gayla Tinsley MD   aspirin 81 mg Oral Daily Gayla Tinsley MD   buPROPion 100 mg Oral Q12H [facility] Gayla Tinsley MD   carBAMazepine 100 mg Oral 4x Daily Gayla Tinsley MD   docusate sodium 100 mg Oral BID Sunny Luong PA-C   doxazosin 4 mg Oral HS Gayla Tinsley MD   fluticasone 2 puff Inhalation Q12H [facility] Gayla Tinsley MD   heparin (porcine) 5,000 Units Subcutaneous [facility] Gayla Tinsley MD   losartan 100 mg Oral Daily Gayla Tinsley MD   And       hydrochlorothiazide 12 5 mg Oral Daily Gayla Tinsley MD   levothyroxine 137 mcg Oral Early Morning Gayla Tinsley MD   lidocaine 1 patch Topical Daily Red Soria MD   lidocaine (PF) 10 mL Infiltration Once Red Soria MD   methocarbamol 500 mg Oral Q6H PRN Red Soria MD   oxyCODONE 5 mg Oral Q4H PRN Red Soria MD   oxyCODONE 2 5 mg Oral Q4H PRN Red Soria MD   polyethylene glycol 17 g Oral Daily PRN Chelita Ashraf PA-C   verapamil 180 mg Oral HS Red Soria MD     Continuous Infusions:   PRN Meds: albuterol    methocarbamol    oxyCODONE    oxyCODONE    polyethylene glycol    Surgical procedures (if appropriate):

## 2018-10-01 NOTE — CASE MANAGEMENT
Initial Clinical Review    Thank you,  [address] [facility] Review Department  Phone: [phone]; Fax [phone]  ATTENTION: Please call with any questions or concerns to [phone]  and carefully follow the prompts so that you are directed to the right person  Send all requests for admission clinical reviews, approved or denied determinations and any other requests to fax [phone]  All voicemails are confidential         Admission: Date/Time/Statement: Observation 9/27 @ 1541 -- updated to INPATIENT 10/1 @ [phone] for continued treatment of hyopnatremia and orthostatic hypotension    10/01/18 1246  Inpatient Admission Once     Transfer Service: Trauma       Question Answer Comment   Admitting Physician SOCAR OLIVO    Level of Care Med Surg    Bed Type Trauma    Estimated length of stay More than 2 Midnights    Certification I certify that inpatient services are medically necessary for this patient for a duration of greater than two midnights  See H&P and MD Progress Notes for additional information about the patient's course of treatment  10/01/18 1245        ED: Date/Time/Mode of Arrival:             ED Arrival Information      Expected Arrival Acuity Means of Arrival Escorted By Service Admission Type     - 9/27/2018 12:29 Emergent Walk-In Self Trauma Emergency     Arrival Complaint     fell             Chief Complaint:        Chief Complaint   Patient presents with    Fall       pt fell down a flight of cement steps  denies CP, SOB, dizziness  pt has laceration to inside of lower lip, abrasions to bilateral arms          History of Illness:  69 y  o  female who presents following a mechanical fall down a flight of concrete steps  She denies any LOC  She is GCS 15   CT chest showing right rib fractures   FAST was done and negative   Currently she is complaining of right rib and left forearm pain      ED Vital Signs:            ED Triage Vitals [09/27/18 1236] Temperature Pulse Respirations Blood Pressure SpO2   (!) 96 8 °F (36 °C) 86 20 113/58 95 %       Temp Source Heart Rate Source Patient Position - Orthostatic VS BP Location FiO2 (%)   Tympanic Monitor Sitting Left arm --       Pain Score           Worst Possible Pain                Wt Readings from Last 1 Encounters:   09/27/18 68 5 kg (151 lb)         Vital Signs (abnormal): WNL     Abnormal Labs: Na = 128     Diagnostic Test Results: CT Chest - Nondisplaced right anterior 4th through 6th rib fractures      Xray Left Elbow - Questionable nondisplaced articular surface fracture of the radial head   Recommend appropriate clinical management and reimaging in 7-10 days   Small effusion suggested as well         ED Treatment:             Medication Administration from 09/27/2018 1229 to 09/27/2018 2123        Date/Time Order Dose Route Action       09/27/2018 1330 acetaminophen (TYLENOL) tablet 975 mg 975 mg Oral Given       09/27/2018 1343 morphine (PF) 4 mg/mL injection 4 mg 4 mg Intravenous Given       09/27/2018 1441 tetanus-diphtheria-acellular pertussis (BOOSTRIX) IM injection 0 5 mL 0 5 mL Intramuscular Given       09/27/2018 1442 morphine (PF) 4 mg/mL injection 4 mg 4 mg Intravenous Given       09/27/2018 1537 lidocaine (PF) (XYLOCAINE-MPF) 1 % injection **AcuDose Override Pull**     Given by Other             Past Medical/Surgical History:        Past Medical History:   Diagnosis Date    Arthritis      Asthma      Bipolar disorder ([facility] [address] )      Cyst of left ovary      Depression      Diverticulitis of colon      Hypertension      Hypothyroidism      Rheumatic fever      Umbilical hernia      Uterine leiomyoma           Admitting Diagnosis: Lip laceration, initial encounter [S01 511A]  Injury, unspecified, initial encounter [T14 90XA]  Closed nondisplaced fracture of head of left radius, initial encounter [S52 125A]  Unspecified multiple injuries, initial encounter [T07  XXXA]     Age/Sex: 67 y o  female     Assessment/Plan:  79y Female to ED with S/P Fall down concrete steps/ Right Rib Fractures 4-6/ Possible Left Radial Head Fracture  Rib fracture protocol  Incentive Spirometry  Pain control  Orthopedic consult      9/27 Orthopedic cons:  NWB LUE in a posterior slab splint and sling  No operative intervention   Pain control     Admission Orders:  S/P Laceration Repair  Continuous pulse oximetry  Orthopedic Surgery cons  PT/OT eval and treat     Scheduled Meds:   Current Facility-Administered Medications:  acetaminophen 975 mg Oral Q8H MARGARITA   aspirin 81 mg Oral Daily   buPROPion 100 mg Oral Q12H [address]   carBAMazepine 100 mg Oral 4x Daily   doxazosin 4 mg Oral HS   fluticasone 2 puff Inhalation Q12H [address]   heparin (porcine) 5,000 Units Subcutaneous Q8H [address]   losartan 100 mg Oral Daily   And         hydrochlorothiazide 12 5 mg Oral Daily   levothyroxine 137 mcg Oral Early Morning   lidocaine 1 patch Topical Daily   lidocaine (PF) 10 mL Infiltration Once   verapamil 180 mg Oral HS      PRN Meds: albuterol    methocarbamol    oxyCODONE po x1        Ref Range & Units    10/1/18 1103 9/30/18 0446 9/29/18 0437 9/28/18 1054 9/27/18 1343   Sodium 136 - 145 mmol/L 131   128   130   128   128         9/30 -- BP 86/51    Continue IS q1h while awake  Encourage deep breathing and coughing  NV checks  Regular diet  Fluid restriction 1200 ml/day  Monitor I&O's      PT/OT recommending --- IP rehab on d/c when medically cleared

## 2018-10-01 NOTE — SOCIAL WORK
CM was informed by [facility], that the pt's insurance is out of network   CM placed new referrals at the pt and family's request  Pt is medically stable but will need an accepting facility and insurance auth

## 2018-10-01 NOTE — ASSESSMENT & PLAN NOTE
- Multiple right-sided rib fractures (# 4-6)  - Continue rib fracture protocol   - Continue to encourage aggressive incentive spirometer use and adequate pulmonary hygiene  - Continue activity in being out of bed frequently  - Monitor oxygen saturation and provide supplemental O2 as needed to maintain O2 saturation greater than or equal to 94%  - Continue multimodal analgesic regimen  - Chest x-ray findings from 09/28/2018 reviewed

## 2018-10-01 NOTE — PROGRESS NOTES
Rounding note with UMAIR Cabrera-spoke to patient about pain from fractured ribs overnight  Also about continued observation of orthostatic episodes

## 2018-10-01 NOTE — PROGRESS NOTES
Progress Note - Liss Honor 1945, 67 y o  female MRN: [id number]    [facility]: [id number]    Primary Care Provider: Danni Charles DO   Date and time admitted to hospital: 9/27/2018 12:37 PM        Fall   Assessment & Plan    - Status post mechanical fall down multiple concrete steps  - Fall precautions  - Continue PT and OT evaluation and treatment as indicated  * Closed fracture of multiple ribs of right side with routine healing   Assessment & Plan    - Multiple right-sided rib fractures (# 4-6)  - Continue rib fracture protocol   - Continue to encourage aggressive incentive spirometer use and adequate pulmonary hygiene  - Continue activity in being out of bed frequently  - Monitor oxygen saturation and provide supplemental O2 as needed to maintain O2 saturation greater than or equal to 94%  - Continue multimodal analgesic regimen  - Chest x-ray findings from 09/28/2018 reviewed  Left radial head fracture   Assessment & Plan    - Appreciate orthopedic surgery evaluation and recommendations   - Non-operative management per Ortho  - Maintain left upper extremity splint splint and nonweightbearing status on the left upper extremity   - Stable for discharge from Orthopedic standpoint   - Outpatient follow-up with Orthopedics in 2 weeks  Complicated laceration of lip   Assessment & Plan    - Lip laceration status post suture repair   - Continue local wound care  - Sutures will dissolve  Hyponatremia   Assessment & Plan    - Hyponatremia of unclear etiology  No significant hyponatremia at baseline after discussion with PCP   - Patient remains asymptomatic   - Continue oral diet with fluid restriction of 1200 ml per day  - Will need outpatient follow-up with PCP for additional workup; I did speak with Dr Danni Charles on 10/01/2018 who will perform any additional workup needed           Nurse-patient-provider rounds completed today with the patient's nurse, Joe Christianson  Disposition:  Anticipate discharge to rehab in the next 24 hours pending insurance authorization  Subjective:  Patient is feeling well today  She did have an episode overnight of significant chest pain related to rib fractures after she rolled onto that side accidentally  Her breathing has remained on labored  Her pain is well controlled otherwise  She is tolerating a diet without nausea, vomiting, constipation  Objective:    Meds/Allergies   Prescriptions Prior to Admission   Medication Sig Dispense Refill Last Dose    ALBUTEROL SULFATE HFA IN Inhale as needed   9/27/2018 at Unknown time    aspirin (ECOTRIN LOW STRENGTH) 81 mg EC tablet Take 81 mg by mouth daily  Past Month at Unknown time    BUPROPION HCL ER, SR, PO Take 300 mg by mouth daily     9/27/2018 at Unknown time    Calcium Carbonate-Vitamin D (CALTRATE 600+D PO) Take 1 tablet by mouth daily  9/27/2018 at Unknown time    carBAMazepine (TEGretol) 200 mg tablet Take 100 mg by mouth 4 (four) times a day  9/27/2018 at Unknown time    Cholecalciferol (VITAMIN D3) 1000 UNITS CAPS Take 1 capsule by mouth daily  9/27/2018 at Unknown time    doxazosin (CARDURA) 2 mg tablet Take 2 mg by mouth daily at bedtime     9/27/2018 at Unknown time    fluticasone (FLOVENT HFA) 220 mcg/act inhaler Inhale 2 puffs 2 (two) times a day as needed Rinse mouth after use    9/27/2018 at Unknown time    Levothyroxine Sodium 137 MCG CAPS Take 1 tablet by mouth Indications: 1T 5x/week  9/27/2018 at Unknown time    Omega-3 Fatty Acids (OMEGA-3 FISH OIL PO) Take 1 capsule by mouth daily Indications: 1000-300mg  Past Month at Unknown time    valsartan-hydrochlorothiazide (DIOVAN-HCT) 320-12 5 MG per tablet Take 1 tablet by mouth daily  9/27/2018 at Unknown time    verapamil (CALAN-SR) 180 mg CR tablet Take 180 mg by mouth daily at bedtime     9/27/2018 at Unknown time       Current Facility-Administered Medications:    acetaminophen (TYLENOL) tablet 975 mg, 975 mg, Oral, Q8H [facility], Radha Wynn MD, 975 mg at 10/01/18 0526    albuterol (PROVENTIL HFA,VENTOLIN HFA) inhaler 1 puff, 1 puff, Inhalation, Q6H PRN, Radha Wynn MD    aspirin (ECOTRIN LOW STRENGTH) EC tablet 81 mg, 81 mg, Oral, Daily, Radha Wynn MD, 81 mg at 10/01/18 0808    buPROPion [facility]) 12 hr tablet 100 mg, 100 mg, Oral, Q12H [facility], Radha Wynn MD, 100 mg at 10/01/18 0806    carBAMazepine (TEGretol) tablet 100 mg, 100 mg, Oral, 4x Daily, Radha Wynn MD, 100 mg at 10/01/18 1145    docusate sodium (COLACE) capsule 100 mg, 100 mg, Oral, BID, Prakash Loredo PA-C, 100 mg at 10/01/18 0808    doxazosin (CARDURA) tablet 4 mg, 4 mg, Oral, HS, Radha Wynn MD, 4 mg at 09/30/18 2245    fluticasone (FLOVENT HFA) 220 mcg/act inhaler 2 puff, 2 puff, Inhalation, Q12H [facility], Radha Wynn MD    heparin (porcine) subcutaneous injection 5,000 Units, 5,000 Units, Subcutaneous, Q8H [facility], 5,000 Units at 10/01/18 0526 **AND** Platelet count, , , Once, Radha Wynn MD    losartan (COZAAR) tablet 100 mg, 100 mg, Oral, Daily, 100 mg at 09/29/18 0815 **AND** hydrochlorothiazide (HYDRODIURIL) tablet 12 5 mg, 12 5 mg, Oral, Daily, Radha Wynn MD, 12 5 mg at 09/29/18 0815    levothyroxine tablet 137 mcg, 137 mcg, Oral, Early Morning, Radha Wynn MD, 137 mcg at 10/01/18 0526    lidocaine (LIDODERM) 5 % patch 1 patch, 1 patch, Topical, Daily, Radha Wynn MD, 1 patch at 09/30/18 1462    lidocaine (PF) (XYLOCAINE-MPF) 1 % injection 10 mL, 10 mL, Infiltration, Once, Radha Wynn MD    methocarbamol (ROBAXIN) tablet 500 mg, 500 mg, Oral, Q6H PRN, Radha Wynn MD, 500 mg at 10/01/18 0541    oxyCODONE (ROXICODONE) IR tablet 5 mg, 5 mg, Oral, Q4H PRN, Radha Wynn MD, 5 mg at 10/01/18 0535    oxyCODONE (ROXICODONE) oral solution 2 5 mg, 2 5 mg, Oral, Q4H PRN, Radha Wynn MD, 2 5 mg at 09/29/18 0144    polyethylene glycol (MIRALAX) packet 17 g, 17 g, Oral, Daily PRN, Jenny Gillis PA-C    verapamil (CALAN-SR) CR tablet 180 mg, 180 mg, Oral, HS, Cadence Portillo MD, 180 mg at 18 2249    Vitals: Blood pressure 101/58, pulse 63, temperature 97 8 °F (36 6 °C), temperature source Oral, resp  rate 16, height 5' 6" (1 676 m), weight 68 5 kg (151 lb), SpO2 100 %  Body mass index is 24 37 kg/m²  SpO2: SpO2: 100 %  Temp (24hrs), Av °F (36 7 °C), Min:97 8 °F (36 6 °C), Max:98 2 °F (36 8 °C)  Current: Temperature: 97 8 °F (36 6 °C)    ABG: No results found for: PHART, OPL3JYD, PO2ART, VMI2QLX, J1BNWGOP, BEART, SOURCE      Intake/Output Summary (Last 24 hours) at 10/01/18 1225  Last data filed at 10/01/18 0900   Gross per 24 hour   Intake              680 ml   Output                0 ml   Net              680 ml       Invasive Devices     Peripheral Intravenous Line            Peripheral IV 18 Right Forearm 3 days                          Nutrition/GI Proph/Bowel Reg:  Regular diet with 1 2 L fluid restriction daily; MiraLax and Colace  Physical Exam:     GENERAL APPEARANCE: Patient in no acute distress  HEENT: NC, bilateral periorbital ecchymosis improving; PERRL, EOMs intact; Mucous membranes moist  NECK / BACK:  Nontender  CV: Regular rate and rhythm; + S1, S2; no murmur/gallops/rubs appreciated  CHEST / LUNGS: Clear to auscultation; no wheezes/rales/rhonci  ABD: NABS; soft; non-distended; non-tender  EXT: +2 pulses bilaterally upper & lower extremities; no clubbing/cyanosis/edema; left upper extremity splint and overlying dressing remains intact and clean without tenderness on today's exam; superficial abrasions over the right upper extremity continue to heal appropriately  NEURO: GCS 15; no focal neurologic deficits; neurovascularly intact  SKIN: Warm, dry and well perfused; no rash; no jaundice  Lab Results:   Results: I have personally reviewed pertinent reports     and BMP/CMP:   Lab Results   Component Value Date     (L) 10/01/2018    K 3 6 10/01/2018     10/01/2018    CO2 25 10/01/2018    BUN 8 10/01/2018    CREATININE 0 70 10/01/2018    CALCIUM 8 2 (L) 10/01/2018    EGFR 87 10/01/2018     Imaging/EKG Studies: Results: I have personally reviewed pertinent reports      Other Studies: N/A  VTE Prophylaxis:  Subcutaneous heparin    Lencho Engel PA-C  10/1/2018 12:25 PM

## 2018-10-01 NOTE — SOCIAL WORK
[facility] pt[facility], can accept  They will submit for auth  CM will inform pt and her family

## 2018-10-02 ENCOUNTER — TELEPHONE (OUTPATIENT)
Dept: OBGYN CLINIC | Facility: HOSPITAL | Age: 73
End: 2018-10-02

## 2018-10-02 VITALS
SYSTOLIC BLOOD PRESSURE: 135 MMHG | RESPIRATION RATE: 18 BRPM | OXYGEN SATURATION: 97 % | BODY MASS INDEX: 24.27 KG/M2 | DIASTOLIC BLOOD PRESSURE: 63 MMHG | WEIGHT: 151 LBS | TEMPERATURE: 98.1 F | HEIGHT: 66 IN | HEART RATE: 69 BPM

## 2018-10-02 PROCEDURE — 99238 HOSP IP/OBS DSCHRG MGMT 30/<: CPT | Performed by: NURSE PRACTITIONER

## 2018-10-02 RX ORDER — OXYCODONE HYDROCHLORIDE 5 MG/1
5 TABLET ORAL EVERY 4 HOURS PRN
Qty: 20 TABLET | Refills: 0 | Status: SHIPPED | OUTPATIENT
Start: 2018-10-02

## 2018-10-02 RX ORDER — METHOCARBAMOL 500 MG/1
500 TABLET, FILM COATED ORAL EVERY 6 HOURS PRN
Qty: 30 TABLET | Refills: 0 | Status: SHIPPED | OUTPATIENT
Start: 2018-10-02 | End: 2018-10-11 | Stop reason: SDUPTHER

## 2018-10-02 RX ORDER — ACETAMINOPHEN 325 MG/1
650 TABLET ORAL EVERY 6 HOURS PRN
Qty: 30 TABLET | Refills: 0 | Status: SHIPPED | OUTPATIENT
Start: 2018-10-02

## 2018-10-02 RX ADMIN — CARBAMAZEPINE 100 MG: 200 TABLET ORAL at 08:06

## 2018-10-02 RX ADMIN — LEVOTHYROXINE SODIUM 137 MCG: 112 TABLET ORAL at 05:55

## 2018-10-02 RX ADMIN — METHOCARBAMOL 500 MG: 500 TABLET ORAL at 12:25

## 2018-10-02 RX ADMIN — BUPROPION HYDROCHLORIDE 100 MG: 100 TABLET, FILM COATED, EXTENDED RELEASE ORAL at 11:55

## 2018-10-02 RX ADMIN — LOSARTAN POTASSIUM 100 MG: 50 TABLET ORAL at 08:05

## 2018-10-02 RX ADMIN — HYDROCHLOROTHIAZIDE 12.5 MG: 12.5 TABLET ORAL at 08:05

## 2018-10-02 RX ADMIN — OXYCODONE HYDROCHLORIDE 5 MG: 5 TABLET ORAL at 00:13

## 2018-10-02 RX ADMIN — METHOCARBAMOL 500 MG: 500 TABLET ORAL at 05:55

## 2018-10-02 RX ADMIN — DOCUSATE SODIUM 100 MG: 100 CAPSULE, LIQUID FILLED ORAL at 08:05

## 2018-10-02 RX ADMIN — HEPARIN SODIUM 5000 UNITS: 5000 INJECTION, SOLUTION INTRAVENOUS; SUBCUTANEOUS at 05:54

## 2018-10-02 RX ADMIN — ASPIRIN 81 MG: 81 TABLET, COATED ORAL at 08:05

## 2018-10-02 RX ADMIN — ACETAMINOPHEN 975 MG: 325 TABLET ORAL at 05:55

## 2018-10-02 NOTE — PLAN OF CARE
Discharge to home or other facility with appropriate resources Progressing      Absence or prevention of progression during hospitalization Progressing      Patient/family/caregiver demonstrates understanding of disease process, treatment plan, medications, and discharge instructions Progressing      Maintain or return mobility to safest level of function Progressing      Maintain proper alignment of affected body part Progressing      Verbalizes/displays adequate comfort level or baseline comfort level Progressing      Patient will remain free of falls Progressing      Maintain or return to baseline ADL function Progressing      Maintain or return mobility status to optimal level Progressing      Patient will remain free of falls Progressing

## 2018-10-02 NOTE — TELEPHONE ENCOUNTER
Patient was seen in the ER by Dr Colan Ormond on 09/27  She has a follow up on 10/11  [facility] is calling because they have an order to wait until after the first follow up to start PT and OT  They are asking if this is correct   If not, they are asking for an order to be faxed to #[phone]

## 2018-10-02 NOTE — PLAN OF CARE
Discharge to home or other facility with appropriate resources Adequate for Discharge      Absence or prevention of progression during hospitalization Adequate for Discharge      Patient/family/caregiver demonstrates understanding of disease process, treatment plan, medications, and discharge instructions Adequate for Discharge      Maintain or return mobility to safest level of function Adequate for Discharge      Maintain proper alignment of affected body part Adequate for Discharge      Verbalizes/displays adequate comfort level or baseline comfort level Adequate for Discharge      Patient will remain free of falls Adequate for Discharge      Maintain or return to baseline ADL function Adequate for Discharge      Maintain or return mobility status to optimal level Adequate for Discharge      Patient will remain free of falls Adequate for Discharge

## 2018-10-02 NOTE — PROGRESS NOTES
Pt care rounds completed with sadiq Gunter anticipate discharge to [facility] today, will speak with ortho in am rounds pt requesting to speak with them, will write weight bear status as recommended by ortho

## 2018-10-02 NOTE — ASSESSMENT & PLAN NOTE
- Hyponatremia of unclear etiology  No significant hyponatremia at baseline after discussion with PCP   - Patient remains asymptomatic   - Continue oral diet with fluid restriction of 1200 ml per day    - Will need outpatient follow-up with PCP for additional workup; Dr Gustabo Marquez made aware on 10/01/2018 and agreeable with outpatient follow-up

## 2018-10-02 NOTE — ASSESSMENT & PLAN NOTE
- Patient had been having presyncopal symptoms with positional changes and is noted to have positive orthostatics hypertension during her therapy evaluation on 09/29/2018  - She denies any recurrent symptoms following fluid bolus on 09/29/2018  Repeat orthostatic vital signs stable  - Patient cautioned to take her time when getting up and spend additional time sitting on the side of bed prior to standing as well as to call for help and get up only with assistance  - No evidence of anemia with stable hemoglobin the last 2 days  Repeat CBC on 09/30/2018 demonstrated minimal decrease in hemoglobin  No further indication for serial hemoglobins as well as patient remains asymptomatic

## 2018-10-02 NOTE — ASSESSMENT & PLAN NOTE
- Multiple right-sided rib fractures (# 4-6)  - Continue rib fracture protocol, IS > 1750 this AM  - Continue to encourage aggressive incentive spirometer use and adequate pulmonary hygiene  - Continue activity in being out of bed frequently  - Continue multimodal analgesic regimen    - Chest x-ray findings from 09/28/2018 reviewed, no PTX  - stable for DC, outpatient follow-up in trauma in 2 weeks

## 2018-10-02 NOTE — DISCHARGE SUMMARY
Discharge Summary - Aj Lala 67 y o  female MRN: [id number]    [facility] Encounter: [id number]    Admission Date:   Admission Orders     Ordered        10/01/18 1245  Inpatient Admission  Once         09/27/18 1549  Place in Observation  Once               Admitting Diagnosis: Lip laceration, initial encounter [S01 511A]  Injury, unspecified, initial encounter [T14 90XA]  Closed nondisplaced fracture of head of left radius, initial encounter [S52 125A]  Unspecified multiple injuries, initial encounter [T07  XXXA]    HPI: Per Dr Deepa Sandra on admission "Aj Lala is a 67 y o  female who presents following a mechanical fall down a flight of concrete steps  She denies any LOC  She is GCS 15  CT head and neck was negative  CT chest showing right rib fractures  FAST was done and negative  XR of left elbow showing possible fracture of radial head "        Procedures Performed:   Orders Placed This Encounter   Procedures    ED ECG Documentation Only    Laceration repair       Summary of Hospital Course: Mrs Gerda Donato was treated for multiple rib fractures, complex lip laceration and right radial head fracture  She was placed in a splint by orthopedic surgery with outpatient follow-up recommended  She was placed on a multi-modal analgesia regimen and was able to achieve > 1750 on IS consistently and tolerate OOB activity on room air  Her lip laceration was repaired with dissolvable sutures and she was tolerating a diet without complication  She was recommended for rehab after working with PT/OT services  She is stable for discharge with outpatient follow-up with ortho and trauma as well as her PCP  She was noted to have hyponatremia this admission which is stable and asymptomatic for which Dr Eva Garza, her PCP, was contacted and agreeable to follow as an outpatient        Significant Findings, Care, Treatment and Services Provided:   Xr Chest Portable    Result Date: 10/1/2018  Impression: No acute cardiopulmonary disease  No radiographic evidence of rib fracture, pneumothorax, or pleural effusion  Workstation performed: [id number]     Xr Elbow 3+ Views Left    Result Date: 9/27/2018  Impression: Questionable nondisplaced articular surface fracture of the radial head  Recommend appropriate clinical management and reimaging in 7-10 days  Small effusion suggested as well  The study was marked in [facility] for immediate notification  Workstation performed: [id number]     Xr Forearm 2 Views Left    Result Date: 9/27/2018  Impression: No acute abnormalities are visible on the study  Please refer to elbow report regarding possible radial head fracture  Degenerative changes of the wrist Workstation performed: [id number]     Ct Head Without Contrast    Result Date: 9/27/2018  Impression: No acute intracranial abnormality  Workstation performed: [id number]     Ct Chest Without Contrast    Result Date: 9/27/2018  Impression: Nondisplaced right anterior 4th through 6th rib fractures  Workstation performed: [id number]     Ct Spine Cervical Without Contrast    Result Date: 9/27/2018  Impression: No cervical spine fracture or traumatic malalignment  Workstation performed: [id number]       Complications: none    Discharge Diagnosis:   Patient Active Problem List   Diagnosis    Closed fracture of multiple ribs of right side with routine healing    Complicated laceration of lip    Left radial head fracture    Fall    Hyponatremia    Orthostatic hypotension    Constipation         Resolved Problems  Date Reviewed: 10/2/2018    None          Condition at Discharge: fair         Discharge instructions/Information to patient and family:   See after visit summary for information provided to patient and family  Provisions for Follow-Up Care:  See after visit summary for information related to follow-up care and any pertinent home health orders        PCP: Sotero Alexander DO    Disposition: [facility] Planned Readmission: No    Discharge Statement   I spent 24 minutes discharging the patient  This time was spent on the day of discharge  I had direct contact with the patient on the day of discharge  Additional documentation is required if more than 30 minutes were spent on discharge  Discharge Medications:  See after visit summary for reconciled discharge medications provided to patient and family

## 2018-10-03 NOTE — TELEPHONE ENCOUNTER
I will be responsible and accountable for ordering physical therapy for this woman's left elbow  The trauma team can be contacted for physical therapy orders for the rest of the patient  Please call elizabeth Kearney and inform of above    Thank you

## 2018-10-03 NOTE — TELEPHONE ENCOUNTER
bulletn. made aware that Dr Misael Austin is responsible/accountable for the L elbow only   They understood and said that anything outside of that area they will contact trama for orders

## 2018-10-03 NOTE — TELEPHONE ENCOUNTER
[facility] called stating they needed an order for PT for patient so they can get her up and moving  I let them know that you responded to the message and that you would place an order after her visit on 10/11/18  The person on the phone didn't seem happy but I stated that you more than likely wanted to re-evaluate before giving the ok for PT  She was ok after that and stated then I guess we cant see her before her next appt       So unless the plan changes [facility] is aware that NO PT until seen

## 2018-10-11 ENCOUNTER — HOSPITAL ENCOUNTER (OUTPATIENT)
Dept: RADIOLOGY | Facility: HOSPITAL | Age: 73
Discharge: HOME | End: 2018-10-11
Attending: ORTHOPAEDIC SURGERY
Payer: COMMERCIAL

## 2018-10-11 ENCOUNTER — OFFICE VISIT (OUTPATIENT)
Dept: SURGERY | Facility: CLINIC | Age: 73
End: 2018-10-11
Payer: COMMERCIAL

## 2018-10-11 ENCOUNTER — OFFICE VISIT (OUTPATIENT)
Dept: OBGYN CLINIC | Facility: HOSPITAL | Age: 73
End: 2018-10-11

## 2018-10-11 VITALS
WEIGHT: 151 LBS | HEART RATE: 73 BPM | DIASTOLIC BLOOD PRESSURE: 63 MMHG | HEIGHT: 66 IN | SYSTOLIC BLOOD PRESSURE: 101 MMHG | BODY MASS INDEX: 24.27 KG/M2

## 2018-10-11 VITALS
HEIGHT: 66 IN | TEMPERATURE: 97.3 F | BODY MASS INDEX: 25.26 KG/M2 | SYSTOLIC BLOOD PRESSURE: 102 MMHG | DIASTOLIC BLOOD PRESSURE: 60 MMHG | WEIGHT: 157.2 LBS

## 2018-10-11 DIAGNOSIS — Z09 FRACTURE FOLLOW-UP: ICD-10-CM

## 2018-10-11 DIAGNOSIS — S22.41XD CLOSED FRACTURE OF MULTIPLE RIBS OF RIGHT SIDE WITH ROUTINE HEALING: ICD-10-CM

## 2018-10-11 DIAGNOSIS — Z09 FRACTURE FOLLOW-UP: Primary | ICD-10-CM

## 2018-10-11 DIAGNOSIS — S52.125D CLOSED NONDISPLACED FRACTURE OF HEAD OF LEFT RADIUS WITH ROUTINE HEALING, SUBSEQUENT ENCOUNTER: ICD-10-CM

## 2018-10-11 PROCEDURE — 99213 OFFICE O/P EST LOW 20 MIN: CPT | Performed by: SURGERY

## 2018-10-11 PROCEDURE — 99024 POSTOP FOLLOW-UP VISIT: CPT | Performed by: ORTHOPAEDIC SURGERY

## 2018-10-11 PROCEDURE — 73070 X-RAY EXAM OF ELBOW: CPT

## 2018-10-11 RX ORDER — CARBAMAZEPINE 100 MG/1
TABLET, CHEWABLE ORAL
Refills: 3 | COMMUNITY
Start: 2018-08-24

## 2018-10-11 RX ORDER — METHOCARBAMOL 500 MG/1
500 TABLET, FILM COATED ORAL EVERY 6 HOURS PRN
Qty: 30 TABLET | Refills: 0 | Status: SHIPPED | OUTPATIENT
Start: 2018-10-11

## 2018-10-11 RX ORDER — BUPROPION HYDROCHLORIDE 300 MG/1
TABLET ORAL
Refills: 3 | COMMUNITY
Start: 2018-09-21

## 2018-10-11 RX ORDER — LEVOTHYROXINE SODIUM 137 UG/1
TABLET ORAL
Refills: 0 | COMMUNITY
Start: 2018-09-24

## 2018-10-11 NOTE — PROGRESS NOTES
Assessment:  1  Fracture follow-up  XR elbow 2 vw left   2  Closed nondisplaced fracture of head of left radius with routine healing, subsequent encounter       Patient Active Problem List   Diagnosis    Closed fracture of multiple ribs of right side with routine healing    Complicated laceration of lip    Left radial head fracture    Fall    Hyponatremia    Orthostatic hypotension    Constipation           Plan    Seen examined with Dr Mariano Rodrigez all bear to tolerance  Follow up in 4 weeks for range-of-motion check  No x-rays are needed at that time  Patient was seen and examined with Dr Henry Santizo  Subjective:     Patient ID:    Chief Complaint:Emily Correia 68 y o  female      HPI    20-year-old female who is here for a recheck of her left elbow  She was seen upon her admission for and left radial head fracture  She has been in a posterior splint  Today she has minimal complaints        The following portions of the patient's history were reviewed and updated as appropriate: allergies, current medications, past family history, past social history, past surgical history and problem list     All organ systems normal    Social History     Social History    Marital status: /Civil Union     Spouse name: N/A    Number of children: N/A    Years of education: N/A     Occupational History    retired      Social History Main Topics    Smoking status: Never Smoker    Smokeless tobacco: Never Used    Alcohol use Yes      Comment: rarely    Drug use: No    Sexual activity: No     Other Topics Concern    Not on file     Social History Narrative    No narrative on file     Past Medical History:   Diagnosis Date    Arthritis     Asthma     Bipolar disorder ([facility] [address] )     Cyst of left ovary     Depression     Diverticulitis of colon     Hypertension     Hypothyroidism     Rheumatic fever     history of    Umbilical hernia     Uterine leiomyoma      Past Surgical History:   Procedure Laterality Date    ACHILLES TENDON SURGERY      BACK SURGERY      BILATERAL KNEE ARTHROSCOPY      COLONOSCOPY N/A 9/7/2016    Procedure: COLONOSCOPY;  Surgeon: Mei Calix MD;  [facility]; Service:     ENDOMETRIAL BIOPSY      BY SUCTION    GANGLION CYST EXCISION      WRIST    HERNIA REPAIR      UMBILICAL HERNIA HERNIORRHAPHY    ID ERCP W/SPHINCTEROTOMY/PAPILLOTOMY N/A 7/2/2018    Procedure: ENDOSCOPIC RETROGRADE CHOLANGIOPANCREATOGRAPHY (ERCP); Surgeon: Braxton Retana MD;  [facility]; Service: Gastroenterology    REPLACEMENT TOTAL KNEE      SPINE SURGERY      TONSILLECTOMY AND ADENOIDECTOMY       Allergies   Allergen Reactions    Beta Adrenergic Blockers     Other      Beta blockers     Sulfa Antibiotics Hives    Sulfasalazine Hives     Current Outpatient Prescriptions on File Prior to Visit   Medication Sig Dispense Refill    acetaminophen (TYLENOL) 325 mg tablet Take 2 tablets (650 mg total) by mouth every 6 (six) hours as needed for mild pain 30 tablet 0    ALBUTEROL SULFATE HFA IN Inhale as needed      aspirin (ECOTRIN LOW STRENGTH) 81 mg EC tablet Take 81 mg by mouth daily   Calcium Carbonate-Vitamin D (CALTRATE 600+D PO) Take 1 tablet by mouth daily   Cholecalciferol (VITAMIN D3) 1000 UNITS CAPS Take 1 capsule by mouth daily   doxazosin (CARDURA) 2 mg tablet Take 2 mg by mouth daily at bedtime        fluticasone (FLOVENT HFA) 220 mcg/act inhaler Inhale 2 puffs 2 (two) times a day as needed Rinse mouth after use   methocarbamol (ROBAXIN) 500 mg tablet Take 1 tablet (500 mg total) by mouth every 6 (six) hours as needed for muscle spasms 30 tablet 0    Omega-3 Fatty Acids (OMEGA-3 FISH OIL PO) Take 1 capsule by mouth daily Indications: 1000-300mg        oxyCODONE (ROXICODONE) 5 mg immediate release tablet Take 1 tablet (5 mg total) by mouth every 4 (four) hours as needed for severe pain Earliest Fill Date: 10/2/18 Max Daily Amount: 30 mg 20 tablet 0    valsartan-hydrochlorothiazide (DIOVAN-HCT) 320-12 5 MG per tablet Take 1 tablet by mouth daily   verapamil (CALAN-SR) 180 mg CR tablet Take 180 mg by mouth daily at bedtime   [DISCONTINUED] BUPROPION HCL ER, SR, PO Take 300 mg by mouth daily        [DISCONTINUED] carBAMazepine (TEGretol) 200 mg tablet Take 100 mg by mouth 4 (four) times a day   [DISCONTINUED] Levothyroxine Sodium 137 MCG CAPS Take 1 tablet by mouth Indications: 1T 5x/week  No current facility-administered medications on file prior to visit  Objective:        Left Elbow Exam     Comments:  Full flexion supination and pronation are present to the left side compared to the right  She lacks approximately 5-10 degrees of full terminal extension  I have personally reviewed pertinent films in PACS and my interpretation is Stable nondisplaced left radial head/neck fracture       Portions of the record may have been created with voice recognition software   Occasional wrong word or "sound a like" substitutions may have occurred due to the inherent limitations of voice recognition software   Read the chart carefully and recognize, using context, where substitutions have occurred

## 2018-10-11 NOTE — ASSESSMENT & PLAN NOTE
-continue incentive spirometry  -pain controlled  -weaned off oxycodone  -continue Robaxin  -Tylenol as needed  -discharged from Trauma service  -no further x-rays  -call with questions or concerns

## 2018-10-11 NOTE — PROGRESS NOTES
Office Visit - trauma  Sanju Ahuja MRN: [id number]  Encounter: [id number]    Assessment and Plan    Problem List Items Addressed This Visit     Closed fracture of multiple ribs of right side with routine healing     -continue incentive spirometry  -pain controlled  -weaned off oxycodone  -continue Robaxin  -Tylenol as needed  -discharged from Trauma service  -no further x-rays  -call with questions or concerns         Relevant Medications    methocarbamol (ROBAXIN) 500 mg tablet        Disposition:  Discharged from Trauma service  Continue incentive spirometry  Instructed to see family doctor next week    Chief Complaint:  Sanju Ahuja is a 68 y o  female who presents for Fall (f/u fall)    Subjective      Past Medical History  Past Medical History:   Diagnosis Date    Arthritis     Asthma     Bipolar disorder ([address] )     Cyst of left ovary     Depression     Diverticulitis of colon     Hypertension     Hypothyroidism     Rheumatic fever     history of    Umbilical hernia     Uterine leiomyoma        Past Surgical History  Past Surgical History:   Procedure Laterality Date    ACHILLES TENDON SURGERY      BACK SURGERY      BILATERAL KNEE ARTHROSCOPY      COLONOSCOPY N/A 9/7/2016    Procedure: COLONOSCOPY;  Surgeon: Zane Siddiqi MD;  [facility]; Service:     ENDOMETRIAL BIOPSY      BY SUCTION    GANGLION CYST EXCISION      WRIST    HERNIA REPAIR      UMBILICAL HERNIA HERNIORRHAPHY    HI ERCP W/SPHINCTEROTOMY/PAPILLOTOMY N/A 7/2/2018    Procedure: ENDOSCOPIC RETROGRADE CHOLANGIOPANCREATOGRAPHY (ERCP); Surgeon: Franca Francisco MD;  [facility];   Service: Gastroenterology    REPLACEMENT TOTAL KNEE      SPINE SURGERY      TONSILLECTOMY AND ADENOIDECTOMY         Family History  Family History   Problem Relation Age of Onset    Breast cancer Mother 47    Other Mother         SEPSIS    Thyroid cancer Mother     Other Father         SEPSIS     Diabetes Sister    iMndy Cárdenas Heart disease Sister     Heart disease Brother         CABG     Breast cancer Maternal Aunt     Breast cancer Family         4 AUNTS        Medications  Current Outpatient Prescriptions on File Prior to Visit   Medication Sig Dispense Refill    acetaminophen (TYLENOL) 325 mg tablet Take 2 tablets (650 mg total) by mouth every 6 (six) hours as needed for mild pain 30 tablet 0    ALBUTEROL SULFATE HFA IN Inhale as needed      aspirin (ECOTRIN LOW STRENGTH) 81 mg EC tablet Take 81 mg by mouth daily   B-Complex-C CAPS Take 1 tablet by mouth      buPROPion (WELLBUTRIN XL) 300 mg 24 hr tablet TAKE 1 TABLET BY MOUTH IN THE AM  3    Calcium Carbonate-Vitamin D (CALTRATE 600+D PO) Take 1 tablet by mouth daily   carBAMazepine (TEGretol) 100 mg chewable tablet TAKE 1 ORAL TABLET 4 TIMES A DAY  3    Cholecalciferol (VITAMIN D3) 1000 UNITS CAPS Take 1 capsule by mouth daily   doxazosin (CARDURA) 2 mg tablet Take 2 mg by mouth daily at bedtime        fluticasone (FLOVENT HFA) 220 mcg/act inhaler Inhale 2 puffs 2 (two) times a day as needed Rinse mouth after use   levothyroxine 137 mcg tablet TAKE 1 TABLET BY MOUTH EVERY DAY**DISCONTINUE 150MCG**  0    Omega-3 Fatty Acids (OMEGA-3 FISH OIL PO) Take 1 capsule by mouth daily Indications: 1000-300mg   oxyCODONE (ROXICODONE) 5 mg immediate release tablet Take 1 tablet (5 mg total) by mouth every 4 (four) hours as needed for severe pain Earliest Fill Date: 10/2/18 Max Daily Amount: 30 mg 20 tablet 0    valsartan-hydrochlorothiazide (DIOVAN-HCT) 320-12 5 MG per tablet Take 1 tablet by mouth daily   verapamil (CALAN-SR) 180 mg CR tablet Take 180 mg by mouth daily at bedtime   [DISCONTINUED] BUPROPION HCL ER, SR, PO Take 300 mg by mouth daily        [DISCONTINUED] carBAMazepine (TEGretol) 200 mg tablet Take 100 mg by mouth 4 (four) times a day        [DISCONTINUED] Levothyroxine Sodium 137 MCG CAPS Take 1 tablet by mouth Indications: 1T 5x/week   [DISCONTINUED] methocarbamol (ROBAXIN) 500 mg tablet Take 1 tablet (500 mg total) by mouth every 6 (six) hours as needed for muscle spasms (Patient not taking: Reported on 10/11/2018 ) 30 tablet 0     No current facility-administered medications on file prior to visit  Allergies  Allergies   Allergen Reactions    Beta Adrenergic Blockers     Other      Beta blockers     Sulfa Antibiotics Hives    Sulfasalazine Hives       Review of Systems   Constitutional: Negative for activity change, appetite change and fever  HENT: Negative for ear discharge, ear pain, rhinorrhea, sore throat and trouble swallowing  Eyes: Negative for photophobia, pain and redness  Respiratory: Negative for apnea, cough, chest tightness, shortness of breath and stridor  Cardiovascular: Negative for chest pain and palpitations  Gastrointestinal: Negative for abdominal distention, abdominal pain, nausea and vomiting  Endocrine: Negative for cold intolerance and heat intolerance  Genitourinary: Negative  Musculoskeletal: Negative for arthralgias, back pain, neck pain and neck stiffness  Skin: Negative  Neurological: Negative for dizziness, weakness, light-headedness and numbness  Hematological: Negative  Objective  Vitals:    10/11/18 1507   BP: 102/60   Temp: (!) 97 3 °F (36 3 °C)       Physical Exam   Constitutional: She is oriented to person, place, and time  She appears well-developed and well-nourished  No distress  HENT:   Head: Normocephalic and atraumatic  Eyes: Pupils are equal, round, and reactive to light  Conjunctivae and EOM are normal    Neck: Normal range of motion  Neck supple  Cardiovascular: Normal rate, regular rhythm, normal heart sounds and intact distal pulses  Pulmonary/Chest: Effort normal and breath sounds normal  No respiratory distress  She has no wheezes  Abdominal: Soft  Bowel sounds are normal  She exhibits no distension  There is no tenderness  Musculoskeletal: Normal range of motion  She exhibits no edema or deformity  Neurological: She is alert and oriented to person, place, and time  No cranial nerve deficit  Skin: Skin is warm and dry  She is not diaphoretic  No erythema  Vitals reviewed

## 2018-11-14 ENCOUNTER — OFFICE VISIT (OUTPATIENT)
Dept: OBGYN CLINIC | Facility: HOSPITAL | Age: 73
End: 2018-11-14
Payer: COMMERCIAL

## 2018-11-14 VITALS
HEART RATE: 93 BPM | BODY MASS INDEX: 25.23 KG/M2 | HEIGHT: 66 IN | DIASTOLIC BLOOD PRESSURE: 63 MMHG | WEIGHT: 157 LBS | SYSTOLIC BLOOD PRESSURE: 106 MMHG

## 2018-11-14 DIAGNOSIS — M25.512 CHRONIC LEFT SHOULDER PAIN: ICD-10-CM

## 2018-11-14 DIAGNOSIS — Z09 FRACTURE FOLLOW-UP: Primary | ICD-10-CM

## 2018-11-14 DIAGNOSIS — M75.42 IMPINGEMENT SYNDROME OF LEFT SHOULDER: ICD-10-CM

## 2018-11-14 DIAGNOSIS — G89.29 CHRONIC LEFT SHOULDER PAIN: ICD-10-CM

## 2018-11-14 PROCEDURE — 99213 OFFICE O/P EST LOW 20 MIN: CPT | Performed by: ORTHOPAEDIC SURGERY

## 2018-11-14 PROCEDURE — 20610 DRAIN/INJ JOINT/BURSA W/O US: CPT | Performed by: ORTHOPAEDIC SURGERY

## 2018-11-14 RX ORDER — INFLUENZA A VIRUSA/MICHIGAN/45/2015 X-275 (H1N1) ANTIGEN (FORMALDEHYDE INACTIVATED), INFLUENZA A VIRUS A/HONG KONG/4801/2014 X-263B (H3N2) ANTIGEN (FORMALDEHYDE INACTIVATED), AND INFLUENZA B VIRUS B/BRISBANE/60/2008 ANTIGEN (FORMALDEHYDE INACTIVATED) 60; 60; 60 UG/.5ML; UG/.5ML; UG/.5ML
INJECTION, SUSPENSION INTRAMUSCULAR
Refills: 0 | COMMUNITY
Start: 2018-11-08

## 2018-11-14 RX ORDER — BUPIVACAINE HYDROCHLORIDE 2.5 MG/ML
2 INJECTION, SOLUTION INFILTRATION; PERINEURAL
Status: COMPLETED | OUTPATIENT
Start: 2018-11-14 | End: 2018-11-14

## 2018-11-14 RX ORDER — LIDOCAINE HYDROCHLORIDE 10 MG/ML
2 INJECTION, SOLUTION INFILTRATION; PERINEURAL
Status: COMPLETED | OUTPATIENT
Start: 2018-11-14 | End: 2018-11-14

## 2018-11-14 RX ORDER — BETAMETHASONE SODIUM PHOSPHATE AND BETAMETHASONE ACETATE 3; 3 MG/ML; MG/ML
12 INJECTION, SUSPENSION INTRA-ARTICULAR; INTRALESIONAL; INTRAMUSCULAR; SOFT TISSUE
Status: COMPLETED | OUTPATIENT
Start: 2018-11-14 | End: 2018-11-14

## 2018-11-14 RX ADMIN — LIDOCAINE HYDROCHLORIDE 2 ML: 10 INJECTION, SOLUTION INFILTRATION; PERINEURAL at 14:32

## 2018-11-14 RX ADMIN — BUPIVACAINE HYDROCHLORIDE 2 ML: 2.5 INJECTION, SOLUTION INFILTRATION; PERINEURAL at 14:32

## 2018-11-14 RX ADMIN — BETAMETHASONE SODIUM PHOSPHATE AND BETAMETHASONE ACETATE 12 MG: 3; 3 INJECTION, SUSPENSION INTRA-ARTICULAR; INTRALESIONAL; INTRAMUSCULAR; SOFT TISSUE at 14:32

## 2018-11-14 NOTE — PROGRESS NOTES
68 y o female presents for follow-up  She has been treated nonsurgically for fracture left radial neck, and describes very little pain in her left elbow  She does admit to return of soreness, under mass effect over the posterior aspect left shoulder  She admits to having nonsurgical management of rotator cuff in the past by a practitioner from [facility]  She does admit to occasional shoulder pain, occasional difficulties with overhead activity    Review of Systems  Review of systems negative unless otherwise specified in HPI    Past Medical History  Past Medical History:   Diagnosis Date    Arthritis     Asthma     Bipolar disorder ([facility] [address] )     Cyst of left ovary     Depression     Diverticulitis of colon     Hypertension     Hypothyroidism     Rheumatic fever     history of    Umbilical hernia     Uterine leiomyoma        Past Surgical History  Past Surgical History:   Procedure Laterality Date    ACHILLES TENDON SURGERY      BACK SURGERY      BILATERAL KNEE ARTHROSCOPY      COLONOSCOPY N/A 9/7/2016    Procedure: COLONOSCOPY;  Surgeon: Jareth Joseph MD;  Location: [facility]; Service:     ENDOMETRIAL BIOPSY      BY SUCTION    GANGLION CYST EXCISION      WRIST    HERNIA REPAIR      UMBILICAL HERNIA HERNIORRHAPHY    ND ERCP W/SPHINCTEROTOMY/PAPILLOTOMY N/A 7/2/2018    Procedure: ENDOSCOPIC RETROGRADE CHOLANGIOPANCREATOGRAPHY (ERCP); Surgeon: Bell Russo MD;  Location: [facility]; Service: Gastroenterology    REPLACEMENT TOTAL KNEE      SPINE SURGERY      TONSILLECTOMY AND ADENOIDECTOMY         Current Medications  Current Outpatient Prescriptions on File Prior to Visit   Medication Sig Dispense Refill    acetaminophen (TYLENOL) 325 mg tablet Take 2 tablets (650 mg total) by mouth every 6 (six) hours as needed for mild pain 30 tablet 0    ALBUTEROL SULFATE HFA IN Inhale as needed      aspirin (ECOTRIN LOW STRENGTH) 81 mg EC tablet Take 81 mg by mouth daily        B-Complex-C CAPS Take 1 tablet by mouth      buPROPion (WELLBUTRIN XL) 300 mg 24 hr tablet TAKE 1 TABLET BY MOUTH IN THE AM  3    Calcium Carbonate-Vitamin D (CALTRATE 600+D PO) Take 1 tablet by mouth daily   carBAMazepine (TEGretol) 100 mg chewable tablet TAKE 1 ORAL TABLET 4 TIMES A DAY  3    Cholecalciferol (VITAMIN D3) 1000 UNITS CAPS Take 1 capsule by mouth daily   doxazosin (CARDURA) 2 mg tablet Take 2 mg by mouth daily at bedtime        fluticasone (FLOVENT HFA) 220 mcg/act inhaler Inhale 2 puffs 2 (two) times a day as needed Rinse mouth after use   levothyroxine 137 mcg tablet TAKE 1 TABLET BY MOUTH EVERY DAY**DISCONTINUE 150MCG**  0    methocarbamol (ROBAXIN) 500 mg tablet Take 1 tablet (500 mg total) by mouth every 6 (six) hours as needed for muscle spasms 30 tablet 0    Omega-3 Fatty Acids (OMEGA-3 FISH OIL PO) Take 1 capsule by mouth daily Indications: 1000-300mg   oxyCODONE (ROXICODONE) 5 mg immediate release tablet Take 1 tablet (5 mg total) by mouth every 4 (four) hours as needed for severe pain Earliest Fill Date: 10/2/18 Max Daily Amount: 30 mg 20 tablet 0    valsartan-hydrochlorothiazide (DIOVAN-HCT) 320-12 5 MG per tablet Take 1 tablet by mouth daily   verapamil (CALAN-SR) 180 mg CR tablet Take 180 mg by mouth daily at bedtime  No current facility-administered medications on file prior to visit  Recent Labs [facility]    0  Lab Value Date/Time   HCT 35 5 09/30/2018 0446   HGB 11 8 09/30/2018 0446   WBC 5 77 09/30/2018 0446         Physical exam  · General: Awake, Alert, Oriented  · Eyes: Pupils equal, round and reactive to light  · Heart: regular rate and rhythm  · Lungs: No audible wheezing  · Abdomen: soft  Examination left shoulder find a lipoma this lesion along is post lateral aspect  It is nontender  She has incomplete forward flexion incomplete abduction  She has a positive primary impingement sign    Left elbow has no effusion  Soft tissues are intact  She has good flexion extension  She has no deficit pronation supination  There is no neurovascular compromise to the left upper extremity  Imaging  None obtained today    Procedure  An injection of corticosteroid is provided for left subacromial space  It is documented below    Large joint arthrocentesis  Date/Time: 11/14/2018 2:32 PM  Consent given by: patient  Supporting Documentation  Indications: pain   Procedure Details  Location: shoulder - L subacromial bursa  Medications administered: 2 mL bupivacaine 0 25 %; 2 mL lidocaine 1 %; 12 mg betamethasone acetate-betamethasone sodium phosphate 6 (3-3) mg/mL    Patient tolerance: patient tolerated the procedure well with no immediate complications  Dressing:  Sterile dressing applied          Assessment/Plan:   68 y  o female who has recovered nicely from nonsurgical management of a left radial neck fracture  She does have a pinch min type symptoms from a known rotator cuff tear  I discussed with her the mass in the posterior aspect left shoulders likely lipoma this in etiology  An injection of corticosteroid is provided for subacromial impingement from right left rotator cuff tear  It is advised, except, administers outlined above    I would welcome the opportunity see this patient back in the office should problems arise

## 2019-03-21 ENCOUNTER — HOSPITAL ENCOUNTER (OUTPATIENT)
Dept: RADIOLOGY | Age: 74
Discharge: HOME | End: 2019-03-21
Payer: COMMERCIAL

## 2019-03-21 VITALS — BODY MASS INDEX: 24.43 KG/M2 | HEIGHT: 66 IN | WEIGHT: 152 LBS

## 2019-03-21 DIAGNOSIS — Z12.31 ENCOUNTER FOR SCREENING MAMMOGRAM FOR MALIGNANT NEOPLASM OF BREAST: ICD-10-CM

## 2019-03-21 PROCEDURE — 77067 SCR MAMMO BI INCL CAD: CPT

## 2020-06-01 ENCOUNTER — HOSPITAL ENCOUNTER (OUTPATIENT)
Dept: RADIOLOGY | Age: 75
Discharge: HOME | End: 2020-06-01
Payer: COMMERCIAL

## 2020-06-01 VITALS — HEIGHT: 66 IN | WEIGHT: 152 LBS | BODY MASS INDEX: 24.43 KG/M2

## 2020-06-01 DIAGNOSIS — Z12.31 ENCOUNTER FOR SCREENING MAMMOGRAM FOR MALIGNANT NEOPLASM OF BREAST: ICD-10-CM

## 2020-06-01 DIAGNOSIS — Z12.31 ENCOUNTER FOR SCREENING MAMMOGRAM FOR MALIGNANT NEOPLASM OF BREAST: Primary | ICD-10-CM

## 2020-06-01 PROCEDURE — 77063 BREAST TOMOSYNTHESIS BI: CPT

## 2020-06-01 PROCEDURE — 77067 SCR MAMMO BI INCL CAD: CPT

## 2021-01-29 ENCOUNTER — IMMUNIZATIONS (OUTPATIENT)
Dept: FAMILY MEDICINE CLINIC | Facility: HOSPITAL | Age: 76
End: 2021-01-29

## 2021-01-29 DIAGNOSIS — Z23 ENCOUNTER FOR IMMUNIZATION: Primary | ICD-10-CM

## 2021-01-29 PROCEDURE — 91301 SARS-COV-2 / COVID-19 MRNA VACCINE (MODERNA) 100 MCG: CPT

## 2021-01-29 PROCEDURE — 0011A SARS-COV-2 / COVID-19 MRNA VACCINE (MODERNA) 100 MCG: CPT

## 2021-02-24 ENCOUNTER — IMMUNIZATIONS (OUTPATIENT)
Dept: FAMILY MEDICINE CLINIC | Facility: HOSPITAL | Age: 76
End: 2021-02-24

## 2021-02-24 DIAGNOSIS — Z23 ENCOUNTER FOR IMMUNIZATION: Primary | ICD-10-CM

## 2021-02-24 PROCEDURE — 0012A SARS-COV-2 / COVID-19 MRNA VACCINE (MODERNA) 100 MCG: CPT

## 2021-02-24 PROCEDURE — 91301 SARS-COV-2 / COVID-19 MRNA VACCINE (MODERNA) 100 MCG: CPT

## 2021-06-04 ENCOUNTER — HOSPITAL ENCOUNTER (OUTPATIENT)
Dept: RADIOLOGY | Age: 76
Discharge: HOME | End: 2021-06-04
Payer: COMMERCIAL

## 2021-06-04 VITALS — BODY MASS INDEX: 28.77 KG/M2 | WEIGHT: 179 LBS | HEIGHT: 66 IN

## 2021-06-04 DIAGNOSIS — Z12.31 ENCOUNTER FOR SCREENING MAMMOGRAM FOR MALIGNANT NEOPLASM OF BREAST: ICD-10-CM

## 2021-06-04 PROCEDURE — 77063 BREAST TOMOSYNTHESIS BI: CPT

## 2021-06-04 PROCEDURE — 77067 SCR MAMMO BI INCL CAD: CPT

## 2021-10-06 NOTE — PROGRESS NOTES
Presents with c/o sore throat and stuffy nose tues 10/5 morning and some nose bleeds per mom.  Strep pcr and covid pcr obtained.    Progress Note - Angus Trimble 1945, 67 y o  female MRN: [id number]    Unit/Bed#: [facility] 908-01 Encounter: [id number]    Primary Care Provider: Kay Cam DO   Date and time admitted to hospital: 9/27/2018 12:37 PM        Orthostatic hypotension   Assessment & Plan    - Patient had been having presyncopal symptoms with positional changes and is noted to have positive orthostatics hypertension during her therapy evaluation on 09/29/2018  - She denies any recurrent symptoms following fluid bolus on 09/29/2018  Repeat orthostatic vital signs stable  - Patient cautioned to take her time when getting up and spend additional time sitting on the side of bed prior to standing as well as to call for help and get up only with assistance  - No evidence of anemia with stable hemoglobin the last 2 days  Repeat CBC on 09/30/2018 demonstrated minimal decrease in hemoglobin  No further indication for serial hemoglobins as well as patient remains asymptomatic  Hyponatremia   Assessment & Plan    - Hyponatremia of unclear etiology  No significant hyponatremia at baseline after discussion with PCP   - Patient remains asymptomatic   - Continue oral diet with fluid restriction of 1200 ml per day  - Will need outpatient follow-up with PCP for additional workup; Dr Kay Cam made aware on 10/01/2018 and agreeable with outpatient follow-up     [address] [place] [address]    - Status post mechanical fall down multiple concrete steps  - Fall precautions  - Continue PT and OT evaluation and treatment as indicated  Left radial head fracture   Assessment & Plan    - Appreciate orthopedic surgery evaluation and recommendations   - Non-operative management per Ortho  - Maintain left upper extremity splint splint and nonweightbearing status on the left upper extremity   - Stable for discharge from Orthopedic standpoint   - Outpatient follow-up with Orthopedics in 2 weeks       Complicated laceration of lip Assessment & Plan    - Lip laceration status post suture repair   - Continue local wound care  - Sutures will dissolve  * Closed fracture of multiple ribs of right side with routine healing   Assessment & Plan    - Multiple right-sided rib fractures (# 4-6)  - Continue rib fracture protocol, IS > 1750 this AM  - Continue to encourage aggressive incentive spirometer use and adequate pulmonary hygiene  - Continue activity in being out of bed frequently  - Continue multimodal analgesic regimen  - Chest x-ray findings from 09/28/2018 reviewed, no PTX  - stable for DC, outpatient follow-up in trauma in 2 weeks          Progress Note - Trauma   Sruthi Bartlett 67 y o  female MRN: [id number]  [facility] Encounter: [id number]    Chief Complaint: "I want ortho to redress this splint before I go today "    Subjective: Pt reports that she wants her splint redressed to be "less bulky so I can get it into my clothing sleeves " I explained t he role of the splint and that it will most likely be bulky but will ask ortho to look at it today  She denies constipation, abdominal pain, nausea or vomiting  Denies headaches  Reprost pain in her rib cage, mostly right sided but a little bit bilaterally  She denies SOB but admits to increased pain with IS and coughing  She is achieving > 1750 on IS this morning       Objective:     Meds/Allergies     Current Facility-Administered Medications:     acetaminophen (TYLENOL) tablet 975 mg, 975 mg, Oral, Q8H [facility], Lashon Sosa MD, 975 mg at 10/02/18 0555    albuterol (PROVENTIL HFA,VENTOLIN HFA) inhaler 1 puff, 1 puff, Inhalation, Q6H PRN, Lashon Sosa MD    aspirin (ECOTRIN LOW STRENGTH) EC tablet 81 mg, 81 mg, Oral, Daily, Lashon Sosa MD, 81 mg at 10/02/18 0805    buPROPion [facility]) 12 hr tablet 100 mg, 100 mg, Oral, Q12H [facility], Lashon Sosa MD, 100 mg at 10/01/18 2135    carBAMazepine (TEGretol) tablet 100 mg, 100 mg, Oral, 4x Daily, Cherie Treadwell Henry Carter MD, 100 mg at 10/02/18 0806    docusate sodium (COLACE) capsule 100 mg, 100 mg, Oral, BID, Jett Medeiros PA-C, 100 mg at 10/02/18 0805    doxazosin (CARDURA) tablet 4 mg, 4 mg, Oral, HS, Sadia Cope MD, 4 mg at 10/01/18 2138    fluticasone (FLOVENT HFA) 220 mcg/act inhaler 2 puff, 2 puff, Inhalation, Q12H [address], Sadia Cope MD    heparin (porcine) subcutaneous injection 5,000 Units, 5,000 Units, Subcutaneous, Q8H [address], 5,000 Units at 10/02/18 0554 **AND** Platelet count, , , Once, Sadia Cope MD    losartan (COZAAR) tablet 100 mg, 100 mg, Oral, Daily, 100 mg at 10/02/18 0805 **AND** hydrochlorothiazide (HYDRODIURIL) tablet 12 5 mg, 12 5 mg, Oral, Daily, Sadia Cope MD, 12 5 mg at 10/02/18 0805    levothyroxine tablet 137 mcg, 137 mcg, Oral, Early Morning, Sadia Cope MD, 137 mcg at 10/02/18 0555    lidocaine (LIDODERM) 5 % patch 1 patch, 1 patch, Topical, Daily, Sadia Cope MD, 1 patch at 10/01/18 2139    lidocaine (PF) (XYLOCAINE-MPF) 1 % injection 10 mL, 10 mL, Infiltration, Once, Sadia Cope MD    methocarbamol (ROBAXIN) tablet 500 mg, 500 mg, Oral, Q6H PRN, Sadia Cope MD, 500 mg at 10/02/18 0555    oxyCODONE (ROXICODONE) IR tablet 5 mg, 5 mg, Oral, Q4H PRN, Sadia Cope MD, 5 mg at 10/02/18 0013    oxyCODONE (ROXICODONE) oral solution 2 5 mg, 2 5 mg, Oral, Q4H PRN, Sadia Cope MD, 2 5 mg at 09/29/18 0145    polyethylene glycol (MIRALAX) packet 17 g, 17 g, Oral, Daily PRN, Crystal Schneider PA-C    verapamil (CALAN-SR) CR tablet 180 mg, 180 mg, Oral, HS, Sadia Cope MD, 180 mg at 10/01/18 2136    Vitals: Blood pressure 135/63, pulse 69, temperature 98 1 °F (36 7 °C), temperature source Oral, resp  rate 18, height 5' 6" (1 676 m), weight 68 5 kg (151 lb), SpO2 97 %  Body mass index is 24 37 kg/m²   SpO2: SpO2: 97 %    ABG: No results found for: PHART, OZA8OFC, PO2ART, LVM1LFM, D3JPCRPL, BEART, SOURCE      Intake/Output Summary (Last 24 hours) at 10/02/18 1100  Last data filed at 10/02/18 0936   Gross per 24 hour   Intake             1520 ml   Output                0 ml   Net             1520 ml       Invasive Devices          No matching active lines, drains, or airways                    Nutrition/GI Proph/Bowel Reg: regular, colace, senna     Physical Exam:   Constitution: patient lying in bed, appears comfortable  HEENT: normocephalic, periorbital bruising improving, minimal swelling, 3 mm MARIA EUGENIA, clear speech, facial symmetry  CV: regular rate and rhythm, no edema, +2 DP pulses  Pulm: CTA, no wheezes, rhonchi or crackles, unlabored, equal bilaterally on room air, some tenderness right sided rib cage with palpation, no crepitus   Abd: soft, nontender, nondistended, active bowel sounds  Musc: moves all extremities, equal strength, left arm in splint, fingers + sensation and motor, < 2 sec cap refill   Neuro: A&O, no focal deficits  Skin: no rash or breakdown, warm        Lab Results:   Results: I have personally reviewed pertinent reports   , BMP/CMP:   Lab Results   Component Value Date     (L) 10/01/2018    K 3 6 10/01/2018     10/01/2018    CO2 25 10/01/2018    BUN 8 10/01/2018    CREATININE 0 70 10/01/2018    CALCIUM 8 2 (L) 10/01/2018    EGFR 87 10/01/2018    and CBC: No results found for: WBC, HGB, HCT, MCV, PLT, ADJUSTEDWBC, MCH, MCHC, RDW, MPV, NRBC  Imaging/EKG Studies: Results: I have personally reviewed pertinent reports  and I have personally reviewed pertinent films in PACS    VTE Prophylaxis: SCDs, Lovenox    Nurse rounds completed with Laurie Rai

## (undated) DEVICE — NEEDLE KNIFE XL 5.5FR  TRIPLE LUMEN

## (undated) DEVICE — SPHINCTEROTOME: Brand: DREAMTOME™ RX 44

## (undated) DEVICE — GUIDEWIRE 0.035IN 270CM STR TIP VISIGLIDE

## (undated) DEVICE — ERCP CANNULA: Brand: RX ERCP CANNULA